# Patient Record
Sex: FEMALE | Race: BLACK OR AFRICAN AMERICAN | NOT HISPANIC OR LATINO | Employment: OTHER | URBAN - METROPOLITAN AREA
[De-identification: names, ages, dates, MRNs, and addresses within clinical notes are randomized per-mention and may not be internally consistent; named-entity substitution may affect disease eponyms.]

---

## 2023-12-06 ENCOUNTER — APPOINTMENT (OUTPATIENT)
Dept: MRI IMAGING | Facility: CLINIC | Age: 73
End: 2023-12-06
Attending: EMERGENCY MEDICINE
Payer: COMMERCIAL

## 2023-12-06 ENCOUNTER — HOSPITAL ENCOUNTER (INPATIENT)
Facility: CLINIC | Age: 73
LOS: 2 days | Discharge: HOME HEALTH | End: 2023-12-08
Attending: EMERGENCY MEDICINE | Admitting: STUDENT IN AN ORGANIZED HEALTH CARE EDUCATION/TRAINING PROGRAM
Payer: COMMERCIAL

## 2023-12-06 ENCOUNTER — APPOINTMENT (OUTPATIENT)
Dept: CT IMAGING | Facility: CLINIC | Age: 73
End: 2023-12-06
Attending: EMERGENCY MEDICINE
Payer: COMMERCIAL

## 2023-12-06 DIAGNOSIS — I63.9 ACUTE ISCHEMIC STROKE (H): ICD-10-CM

## 2023-12-06 LAB
ALBUMIN UR-MCNC: NEGATIVE MG/DL
ANION GAP SERPL CALCULATED.3IONS-SCNC: 14 MMOL/L (ref 7–15)
APPEARANCE UR: CLEAR
APTT PPP: 24 SECONDS (ref 22–38)
ATRIAL RATE - MUSE: 85 BPM
BASOPHILS # BLD AUTO: 0.1 10E3/UL (ref 0–0.2)
BASOPHILS NFR BLD AUTO: 1 %
BILIRUB UR QL STRIP: NEGATIVE
BUN SERPL-MCNC: 15 MG/DL (ref 8–23)
CALCIUM SERPL-MCNC: 9.3 MG/DL (ref 8.8–10.2)
CHLORIDE SERPL-SCNC: 97 MMOL/L (ref 98–107)
COLOR UR AUTO: ABNORMAL
CREAT SERPL-MCNC: 0.66 MG/DL (ref 0.51–0.95)
DEPRECATED HCO3 PLAS-SCNC: 24 MMOL/L (ref 22–29)
DIASTOLIC BLOOD PRESSURE - MUSE: NORMAL MMHG
EGFRCR SERPLBLD CKD-EPI 2021: >90 ML/MIN/1.73M2
EOSINOPHIL # BLD AUTO: 0 10E3/UL (ref 0–0.7)
EOSINOPHIL NFR BLD AUTO: 1 %
ERYTHROCYTE [DISTWIDTH] IN BLOOD BY AUTOMATED COUNT: 12 % (ref 10–15)
GLUCOSE BLDC GLUCOMTR-MCNC: 373 MG/DL (ref 70–99)
GLUCOSE SERPL-MCNC: 192 MG/DL (ref 70–99)
GLUCOSE UR STRIP-MCNC: 300 MG/DL
HBA1C MFR BLD: 7.7 %
HCT VFR BLD AUTO: 42.1 % (ref 35–47)
HGB BLD-MCNC: 14 G/DL (ref 11.7–15.7)
HGB UR QL STRIP: NEGATIVE
HOLD SPECIMEN: NORMAL
IMM GRANULOCYTES # BLD: 0 10E3/UL
IMM GRANULOCYTES NFR BLD: 0 %
INR PPP: 1.09 (ref 0.85–1.15)
INTERPRETATION ECG - MUSE: NORMAL
KETONES UR STRIP-MCNC: NEGATIVE MG/DL
LEUKOCYTE ESTERASE UR QL STRIP: NEGATIVE
LYMPHOCYTES # BLD AUTO: 2.5 10E3/UL (ref 0.8–5.3)
LYMPHOCYTES NFR BLD AUTO: 31 %
MCH RBC QN AUTO: 29.7 PG (ref 26.5–33)
MCHC RBC AUTO-ENTMCNC: 33.3 G/DL (ref 31.5–36.5)
MCV RBC AUTO: 89 FL (ref 78–100)
MONOCYTES # BLD AUTO: 0.6 10E3/UL (ref 0–1.3)
MONOCYTES NFR BLD AUTO: 8 %
NEUTROPHILS # BLD AUTO: 4.8 10E3/UL (ref 1.6–8.3)
NEUTROPHILS NFR BLD AUTO: 59 %
NITRATE UR QL: NEGATIVE
NRBC # BLD AUTO: 0 10E3/UL
NRBC BLD AUTO-RTO: 0 /100
P AXIS - MUSE: 62 DEGREES
PH UR STRIP: 7 [PH] (ref 5–7)
PLATELET # BLD AUTO: 273 10E3/UL (ref 150–450)
POTASSIUM SERPL-SCNC: 4.3 MMOL/L (ref 3.4–5.3)
PR INTERVAL - MUSE: 174 MS
QRS DURATION - MUSE: 76 MS
QT - MUSE: 356 MS
QTC - MUSE: 423 MS
R AXIS - MUSE: 13 DEGREES
RBC # BLD AUTO: 4.72 10E6/UL (ref 3.8–5.2)
RBC URINE: <1 /HPF
SODIUM SERPL-SCNC: 135 MMOL/L (ref 135–145)
SP GR UR STRIP: 1 (ref 1–1.03)
SQUAMOUS EPITHELIAL: <1 /HPF
SYSTOLIC BLOOD PRESSURE - MUSE: NORMAL MMHG
T AXIS - MUSE: 43 DEGREES
TROPONIN T SERPL HS-MCNC: 8 NG/L
UROBILINOGEN UR STRIP-MCNC: NORMAL MG/DL
VENTRICULAR RATE- MUSE: 85 BPM
WBC # BLD AUTO: 8 10E3/UL (ref 4–11)
WBC URINE: 1 /HPF

## 2023-12-06 PROCEDURE — 99291 CRITICAL CARE FIRST HOUR: CPT | Performed by: NURSE PRACTITIONER

## 2023-12-06 PROCEDURE — 255N000002 HC RX 255 OP 636: Performed by: EMERGENCY MEDICINE

## 2023-12-06 PROCEDURE — 250N000009 HC RX 250: Performed by: EMERGENCY MEDICINE

## 2023-12-06 PROCEDURE — 250N000011 HC RX IP 250 OP 636: Performed by: EMERGENCY MEDICINE

## 2023-12-06 PROCEDURE — 84484 ASSAY OF TROPONIN QUANT: CPT | Performed by: EMERGENCY MEDICINE

## 2023-12-06 PROCEDURE — 36415 COLL VENOUS BLD VENIPUNCTURE: CPT | Performed by: EMERGENCY MEDICINE

## 2023-12-06 PROCEDURE — 83036 HEMOGLOBIN GLYCOSYLATED A1C: CPT | Performed by: STUDENT IN AN ORGANIZED HEALTH CARE EDUCATION/TRAINING PROGRAM

## 2023-12-06 PROCEDURE — 81001 URINALYSIS AUTO W/SCOPE: CPT | Performed by: EMERGENCY MEDICINE

## 2023-12-06 PROCEDURE — 99291 CRITICAL CARE FIRST HOUR: CPT | Mod: 25

## 2023-12-06 PROCEDURE — 70496 CT ANGIOGRAPHY HEAD: CPT

## 2023-12-06 PROCEDURE — 70450 CT HEAD/BRAIN W/O DYE: CPT

## 2023-12-06 PROCEDURE — 80061 LIPID PANEL: CPT | Performed by: STUDENT IN AN ORGANIZED HEALTH CARE EDUCATION/TRAINING PROGRAM

## 2023-12-06 PROCEDURE — A9585 GADOBUTROL INJECTION: HCPCS | Performed by: EMERGENCY MEDICINE

## 2023-12-06 PROCEDURE — 85730 THROMBOPLASTIN TIME PARTIAL: CPT | Performed by: EMERGENCY MEDICINE

## 2023-12-06 PROCEDURE — 250N000013 HC RX MED GY IP 250 OP 250 PS 637: Performed by: EMERGENCY MEDICINE

## 2023-12-06 PROCEDURE — 0042T CT HEAD PERFUSION W CONTRAST: CPT

## 2023-12-06 PROCEDURE — 120N000001 HC R&B MED SURG/OB

## 2023-12-06 PROCEDURE — 70498 CT ANGIOGRAPHY NECK: CPT

## 2023-12-06 PROCEDURE — 85610 PROTHROMBIN TIME: CPT | Performed by: EMERGENCY MEDICINE

## 2023-12-06 PROCEDURE — 93005 ELECTROCARDIOGRAM TRACING: CPT

## 2023-12-06 PROCEDURE — 70553 MRI BRAIN STEM W/O & W/DYE: CPT

## 2023-12-06 PROCEDURE — 80048 BASIC METABOLIC PNL TOTAL CA: CPT | Performed by: EMERGENCY MEDICINE

## 2023-12-06 PROCEDURE — 85025 COMPLETE CBC W/AUTO DIFF WBC: CPT | Performed by: EMERGENCY MEDICINE

## 2023-12-06 PROCEDURE — 99223 1ST HOSP IP/OBS HIGH 75: CPT | Performed by: STUDENT IN AN ORGANIZED HEALTH CARE EDUCATION/TRAINING PROGRAM

## 2023-12-06 RX ORDER — DEXTROSE MONOHYDRATE 25 G/50ML
25-50 INJECTION, SOLUTION INTRAVENOUS
Status: DISCONTINUED | OUTPATIENT
Start: 2023-12-06 | End: 2023-12-08 | Stop reason: HOSPADM

## 2023-12-06 RX ORDER — NIFEDIPINE 30 MG
30 TABLET, EXTENDED RELEASE ORAL DAILY
COMMUNITY

## 2023-12-06 RX ORDER — ASPIRIN 81 MG/1
81 TABLET ORAL DAILY
Status: DISCONTINUED | OUTPATIENT
Start: 2023-12-07 | End: 2023-12-08 | Stop reason: HOSPADM

## 2023-12-06 RX ORDER — CLOPIDOGREL 300 MG/1
300 TABLET, FILM COATED ORAL ONCE
Status: COMPLETED | OUTPATIENT
Start: 2023-12-06 | End: 2023-12-06

## 2023-12-06 RX ORDER — IOPAMIDOL 755 MG/ML
117 INJECTION, SOLUTION INTRAVASCULAR ONCE
Status: COMPLETED | OUTPATIENT
Start: 2023-12-06 | End: 2023-12-06

## 2023-12-06 RX ORDER — METHYLPREDNISOLONE 4 MG
4 TABLET, DOSE PACK ORAL SEE ADMIN INSTRUCTIONS
Status: ON HOLD | COMMUNITY
Start: 2023-11-29 | End: 2023-12-08

## 2023-12-06 RX ORDER — GADOBUTROL 604.72 MG/ML
7 INJECTION INTRAVENOUS ONCE
Status: COMPLETED | OUTPATIENT
Start: 2023-12-06 | End: 2023-12-06

## 2023-12-06 RX ORDER — OMEPRAZOLE 40 MG/1
40 CAPSULE, DELAYED RELEASE ORAL DAILY
COMMUNITY

## 2023-12-06 RX ORDER — ASPIRIN 325 MG
325 TABLET ORAL ONCE
Status: COMPLETED | OUTPATIENT
Start: 2023-12-06 | End: 2023-12-06

## 2023-12-06 RX ORDER — ASPIRIN 81 MG/1
81 TABLET, CHEWABLE ORAL DAILY
Status: DISCONTINUED | OUTPATIENT
Start: 2023-12-07 | End: 2023-12-08 | Stop reason: HOSPADM

## 2023-12-06 RX ORDER — IRBESARTAN AND HYDROCHLOROTHIAZIDE 150; 12.5 MG/1; MG/1
1 TABLET, FILM COATED ORAL DAILY
COMMUNITY
End: 2023-12-06

## 2023-12-06 RX ORDER — LIDOCAINE 40 MG/G
CREAM TOPICAL
Status: DISCONTINUED | OUTPATIENT
Start: 2023-12-06 | End: 2023-12-08 | Stop reason: HOSPADM

## 2023-12-06 RX ORDER — INSULIN LISPRO 100 [IU]/ML
15 INJECTION, SOLUTION INTRAVENOUS; SUBCUTANEOUS
COMMUNITY

## 2023-12-06 RX ORDER — PANTOPRAZOLE SODIUM 40 MG/1
40 TABLET, DELAYED RELEASE ORAL
Status: DISCONTINUED | OUTPATIENT
Start: 2023-12-07 | End: 2023-12-08 | Stop reason: HOSPADM

## 2023-12-06 RX ORDER — NICOTINE POLACRILEX 4 MG
15-30 LOZENGE BUCCAL
Status: DISCONTINUED | OUTPATIENT
Start: 2023-12-06 | End: 2023-12-08 | Stop reason: HOSPADM

## 2023-12-06 RX ORDER — NIFEDIPINE 30 MG/1
30 TABLET, EXTENDED RELEASE ORAL DAILY
Status: DISCONTINUED | OUTPATIENT
Start: 2023-12-07 | End: 2023-12-08

## 2023-12-06 RX ORDER — CLOPIDOGREL BISULFATE 75 MG/1
75 TABLET ORAL DAILY
Status: DISCONTINUED | OUTPATIENT
Start: 2023-12-07 | End: 2023-12-08 | Stop reason: HOSPADM

## 2023-12-06 RX ORDER — ACETAMINOPHEN 325 MG/1
650 TABLET ORAL EVERY 4 HOURS PRN
Status: DISCONTINUED | OUTPATIENT
Start: 2023-12-06 | End: 2023-12-08 | Stop reason: HOSPADM

## 2023-12-06 RX ADMIN — GADOBUTROL 7 ML: 604.72 INJECTION INTRAVENOUS at 16:25

## 2023-12-06 RX ADMIN — CLOPIDOGREL BISULFATE 300 MG: 300 TABLET, FILM COATED ORAL at 17:31

## 2023-12-06 RX ADMIN — ASPIRIN 325 MG ORAL TABLET 325 MG: 325 PILL ORAL at 17:31

## 2023-12-06 RX ADMIN — SODIUM CHLORIDE 100 ML: 9 INJECTION, SOLUTION INTRAVENOUS at 13:12

## 2023-12-06 RX ADMIN — IOPAMIDOL 117 ML: 755 INJECTION, SOLUTION INTRAVENOUS at 13:11

## 2023-12-06 ASSESSMENT — ACTIVITIES OF DAILY LIVING (ADL)
ADLS_ACUITY_SCORE: 35

## 2023-12-06 NOTE — PROGRESS NOTES
"BRIEF VASCULAR NEUROLOGY NOTE    Please see formal Stroke consult note by LINDA Gerard, for more details.    MRI with e/o small acute R corpus callosal stroke.    Recommended to start DAPT, allow for permissive hypertension SBP< 220 and admit for stroke work up - Please use \"Acute ischemic stroke - no thrombolytics order set\".      Nick Mooney MD  Vascular Neurology Fellow  "

## 2023-12-06 NOTE — CONSULTS
"[facility]    Stroke Consult Note    Reason for Consult: Stroke Code     Chief Complaint: Headache and Slurred Speech      HPI  Angelika Quiroz is a 73 year old female with unclear past medical history.  She was brought to the ED for evaluation of altered speech pattern.  History is obtained from her daughter-in-law who reports that 3 days ago the patient's daughter passed away.  Since then, she has not been eating or sleeping well.  Yesterday, it was first noticed that her speech pattern seemed abnormal.  Today on waking, her speech was described as \"stuttering\" and she was talking quieter than usual.  She was also noted to have an abnormal gait, but was not clearly weak in any extremity.    She is not a candidate for TNK due to motor deficits and unclear last known well.  On examination, she had no focal weakness.  Speech exam is limited by language barrier, however her daughter-in-law reports that speech continues to be less fluent than usual.  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    Imaging Findings  CT head negative for acute pathology.  Extensive small vessel disease.  CTA head/neck without significant stenosis or LVO  Small acute infarct in the right corpus callosum    Intravenous Thrombolysis  Not given due to:   - minor/isolated/quickly resolving symptoms  - unclear or unfavorable risk-benefit profile for extended window thrombolysis beyond the conventional 4.5 hour time window    Endovascular Treatment  Not initiated due to absence of proximal vessel occlusion    Impression   Acute ischemic stroke of right corpus callosum due to undetermined etiology       Recommendations  - Use orderset: \"Ischemic Stroke Routine Admission\" or \"Ischemic Stroke No Thrombolytics/No Thrombectomy ICU Admission\"  - Neurochecks and Vital Signs every 4 hours   - Permissive HTN; goal SBP < 220 mmHg  - Daily aspirin 81 mg for " "secondary stroke prevention  - Plavix (clopidogrel) 300 mg PO loading dose x 1  - Plavix (clopidogrel) 75 mg PO Daily  - Statin: High intensity  - TTE (with Bubble Study if age 60 yrs or less)  - Telemetry, EKG  - Bedside Glucose Monitoring  - A1c, Lipid Panel, Troponin x 3  - PT/OT/SLP  - Stroke Education  - Euthermia, Euglycemia    Patient Follow-up     - final recommendation pending work-up    Thank you for this consult. We will continue to follow.      Merlene Carlos, CNP  Vascular Neurology    To page me or covering stroke neurology team member, click here: [facility]  Choose \"On Call\" tab at top, then select \"NEUROLOGY/ALL SITES\" from middle drop-down box, press Enter, then look for \"stroke\" or \"telestroke\" for your site.  ______________________________________________________    Clinically Significant Risk Factors Present on Admission                                    Past Medical History   No past medical history on file.  Past Surgical History   No past surgical history on file.  Medications   Home Meds  Prior to Admission medications    Not on File       Scheduled Meds   aspirin  325 mg Oral Once    clopidogrel  300 mg Oral Once       Infusion Meds      PRN Meds      Allergies   No Known Allergies  Family History   No family history on file.  Social History        Review of Systems   The 10 point Review of Systems is negative other than noted in the HPI or here.        PHYSICAL EXAMINATION  Temp:  [98  F (36.7  C)] 98  F (36.7  C)  Pulse:  [80-97] 83  Resp:  [16-58] 22  BP: (152-201)/(101-119) 152/103  SpO2:  [94 %-97 %] 96 %     Neurologic  Mental Status:  alert, oriented x 3, follows commands, naming and repetition normal, per daughter who is interpreting she has decreased fluency  Cranial Nerves:  visual fields intact, PERRL, EOMI with normal smooth pursuit, facial sensation intact and symmetric, hearing not formally tested but intact to conversation, no dysarthria, shoulder shrug strong bilaterally, " tongue protrusion midline, right nasolabial fold flattening  Motor:  normal muscle tone and bulk, no abnormal movements, able to move all limbs spontaneously, no pronator drift  Reflexes:  toes down-going  Sensory:  light touch sensation intact and symmetric throughout upper and lower extremities, no extinction on double simultaneous stimulation   Coordination:  normal finger-to-nose and heel-to-shin bilaterally without dysmetria  Station/Gait:  deferred    Dysphagia Screen  Per Nursing    Stroke Scales    NIHSS  1a. Level of Consciousness 0-->Alert, keenly responsive   1b. LOC Questions 0-->Answers both questions correctly   1c. LOC Commands 0-->Performs both tasks correctly   2.   Best Gaze 0-->Normal   3.   Visual 0-->No visual loss   4.   Facial Palsy 1-->Minor paralysis (flattened nasolabial fold, asymmetry on smiling)   5a. Motor Arm, Left 0-->No drift, limb holds 90 (or 45) degrees for full 10 secs   5b. Motor Arm, Right 0-->No drift, limb holds 90 (or 45) degrees for full 10 secs   6a. Motor Leg, Left 0-->No drift, leg holds 30 degree position for full 5 secs   6b. Motor Leg, right 0-->No drift, leg holds 30 degree position for full 5 secs   7.   Limb Ataxia 0-->Absent   8.   Sensory 0-->Normal, no sensory loss   9.   Best Language 1-->Mild-to-moderate aphasia, some obvious loss of fluency or facility of comprehension, without significant limitation on ideas expressed or form of expression. Reduction of speech and/or comprehension, however, makes conversation. . . (see row details)   10. Dysarthria 0-->Normal   11. Extinction and Inattention  0-->No abnormality   Total 2 (12/06/23 1400)       Modified Noxon Score (Pre-morbid)  1-No significant disability despite symptoms    Imaging  I personally reviewed all imaging; relevant findings per HPI.     Lab Results Data   CBC  Recent Labs   Lab 12/06/23  1231   WBC 8.0   RBC 4.72   HGB 14.0   HCT 42.1        Basic Metabolic Panel    Recent Labs   Lab  "12/06/23  1339      POTASSIUM 4.3   CHLORIDE 97*   CO2 24   BUN 15.0   CR 0.66   *   FLACA 9.3     Liver Panel  No results for input(s): \"PROTTOTAL\", \"ALBUMIN\", \"BILITOTAL\", \"ALKPHOS\", \"AST\", \"ALT\", \"BILIDIRECT\" in the last 168 hours.  INR    Recent Labs   Lab Test 12/06/23  1339   INR 1.09      Lipid Profile  No lab results found.  A1C  No lab results found.  Troponin    Recent Labs   Lab 12/06/23  1339   CTROPT 8          Stroke Code Data Data   Stroke Code Data  (for stroke code without tele)  Stroke code activated 12/06/23  1306   First stroke provider response 12/06/23  1308   Last known normal 12/05/23      Time of discovery (or onset of symptoms) 12/05/23  1700   Head CT read by Stroke Neuro Provider 12/06/23  1314   Was stroke code de-escalated? Yes  12/06/23  1432       I personally examined and evaluated the patient today. At the time of my evaluation and management the patient was in critical condition today due to concern for stroke. I personally managed imaging review, examination. Key decisions made today included MRI. I spent a total of 60 minutes providing critical care services, evaluating the patient, directing care and reviewing laboratory values and radiologic reports.    "

## 2023-12-06 NOTE — ED PROVIDER NOTES
History     Chief Complaint:  Headache and Slurred Speech       The history is provided by a relative and the patient.      Angelika Quiroz is a 73 year old female with a history of stroke who presents with a headache, slurred speech, and concern for stroke. The patient's daughter-in-law reports that 3 days ago her daughter , and she complained of some sharp chest pain later that same day. 2 days ago the daughter-in-law notes some abnormal voice changes, and generally been sleeping badly, but Angelika was otherwise normal. Yesterday she had a headache, but was talking and ambulating normally. This morning, the patient woke up with whole body shaking, slurred and stuttering speech, and limping on her right leg. The daughter-in-law notes that Angelika still has residual facial droop from her prior stroke, but no residual slurred speech - that symptom is new.  Of note she is not on aspirin or Plavix    Independent Historian:    Daughter-in-law - provided history    Review of External Notes:  None to review      Medications:    The patient is not currently taking any prescribed medications.    Past Medical History:    Stroke     Physical Exam   Patient Vitals for the past 24 hrs:   BP Temp Temp src Pulse Resp SpO2 Weight   23 1700 (!) 152/103 -- -- 83 22 96 % --   23 1645 (!) 186/113 -- -- 82 22 96 % --   23 1530 (!) 178/111 -- -- 82 21 94 % --   23 1509 -- -- -- -- -- -- 72.6 kg (160 lb)   23 1500 (!) 182/105 -- -- 80 20 96 % --   23 1430 (!) 196/116 -- -- 85 22 -- --   23 1400 (!) 192/103 -- -- 90 24 97 % --   23 1345 (!) 186/104 -- -- 82 (!) 58 95 % --   23 1330 (!) 176/119 -- -- 88 16 97 % --   23 1315 (!) 199/101 -- -- -- -- -- --   23 1225 (!) 201/104 98  F (36.7  C) Oral 97 16 97 % --        Physical Exam    Physical Exam   Constitutional:  Patient is oriented to person, place, and time. They appear well-developed and well-nourished.  HENT:    Mouth/Throat:   Oropharynx is clear and moist.   Eyes:    Conjunctivae normal and EOM are normal. Pupils are equal, round, and reactive to light.   Neck:    Normal range of motion.   Cardiovascular: Normal rate, regular rhythm and normal heart sounds.  Exam reveals no gallop and no friction rub.  No murmur heard.  Pulmonary/Chest:  Effort normal and breath sounds normal. Patient has no wheezes. Patient has no rales.   Abdominal:   Soft. Bowel sounds are normal. Patient exhibits no mass. There is no tenderness. There is no rebound and no guarding.   Musculoskeletal:  Normal range of motion. Patient exhibits no edema.   Neurological:   Patient is alert and oriented to person . Patient has normal strength.  Mild flattening of the right nasolabial fold.  Or sensory deficit. GCS 15  Skin:   Skin is warm and dry. No rash noted. No erythema.   Psychiatric:   Patient has a normal mood and affect. Patient's behavior is normal. Judgment and thought content normal.       Emergency Department Course   ECG  ECG taken at 1329, ECG read at 1332  Normal sinus rhythm  Normal ECG   Rate 85 bpm. NH interval 174 ms. QRS duration 76 ms. QT/QTc 356/423 ms. P-R-T axes 62 13 43.    Imaging:  MR Brain w/o & w Contrast   Final Result   IMPRESSION:     1. Small acute infarct in the right corpus callosum and adjacent   supraventricular white matter.   2. There is generalized atrophy of the brain. Extensive white matter   changes are present in the cerebral hemispheres that are consistent   with small vessel ischemic disease in this age patient.      Findings were discussed with Dr. Monroy via telephone at 4:36 PM on   12/6/2023.       CECIL FRYE MD            SYSTEM ID:  [id number]      CT Head Perfusion w Contrast - For Tier 2 Stroke   Final Result   IMPRESSION:    1. No findings to suggest an acute infarct.   2. Very minimal qualitative asymmetry on time to drain, mean transit   time, and cerebral blood flow maps involving the right  anterior   frontal lobe and left lateral frontal lobe. This did not meet criteria   for RAPID analysis. This may reflect low level hypoperfusion in these   areas.      Findings were discussed with Dr. Monroy telephone at 1:46 PM on   12/6/2023.       CECIL FRYE MD            SYSTEM ID:  [id number]      CTA Head Neck with Contrast   Final Result   IMPRESSION:    1. Patent arteries in the head and neck without vascular cutoff. No   evidence of dissection. No aneurysm identified. No significant   stenosis.      Findings were discussed with Dr. Monroy telephone at 1:46 PM on   12/6/2023.       CECIL FRYE MD            SYSTEM ID:  [id number]      CT Head w/o Contrast   Final Result   IMPRESSION:   No evidence of acute territorial infarct. Extensive   sequela of chronic microvascular ischemia somewhat limits evaluation   for small infarcts. Consider MRI if there is further clinical concern.      Findings were discussed with Dr. Monroy telephone at 1:46 PM on   12/6/2023.       CECIL FRYE MD            SYSTEM ID:  [id number]        Report per radiology    Laboratory:  Labs Ordered and Resulted from Time of ED Arrival to Time of ED Departure   BASIC METABOLIC PANEL - Abnormal       Result Value    Sodium 135      Potassium 4.3      Chloride 97 (*)     Carbon Dioxide (CO2) 24      Anion Gap 14      Urea Nitrogen 15.0      Creatinine 0.66      GFR Estimate >90      Calcium 9.3      Glucose 192 (*)    ROUTINE UA WITH MICROSCOPIC REFLEX TO CULTURE - Abnormal    Color Urine Straw      Appearance Urine Clear      Glucose Urine 300 (*)     Bilirubin Urine Negative      Ketones Urine Negative      Specific Gravity Urine 1.005      Blood Urine Negative      pH Urine 7.0      Protein Albumin Urine Negative      Urobilinogen Urine Normal      Nitrite Urine Negative      Leukocyte Esterase Urine Negative      RBC Urine <1      WBC Urine 1      Squamous Epithelials Urine <1     INR - Normal    INR 1.09     PARTIAL  THROMBOPLASTIN TIME - Normal    aPTT 24     TROPONIN T, HIGH SENSITIVITY - Normal    Troponin T, High Sensitivity 8     CBC WITH PLATELETS AND DIFFERENTIAL    WBC Count 8.0      RBC Count 4.72      Hemoglobin 14.0      Hematocrit 42.1      MCV 89      MCH 29.7      MCHC 33.3      RDW 12.0      Platelet Count 273      % Neutrophils 59      % Lymphocytes 31      % Monocytes 8      % Eosinophils 1      % Basophils 1      % Immature Granulocytes 0      NRBCs per 100 WBC 0      Absolute Neutrophils 4.8      Absolute Lymphocytes 2.5      Absolute Monocytes 0.6      Absolute Eosinophils 0.0      Absolute Basophils 0.1      Absolute Immature Granulocytes 0.0      Absolute NRBCs 0.0     GLUCOSE MONITOR NURSING POCT        Procedures   None    Emergency Department Course & Assessments:    Interventions:  Medications   iopamidol (ISOVUE-370) solution 117 mL (117 mLs Intravenous $Given 12/6/23 1311)   sodium chloride 0.9 % bag 500mL for CT scan flush use (100 mLs Intravenous $Given 12/6/23 1312)   gadobutrol (GADAVIST) injection 7 mL (7 mLs Intravenous $Given 12/6/23 1625)   aspirin (ASA) tablet 325 mg (325 mg Oral $Given 12/6/23 1731)   clopidogrel (PLAVIX) tablet 300 mg (300 mg Oral $Given 12/6/23 1731)        Assessments:  1245 I obtained history and examined the patient as noted above.  1248 I declared a Tier 2 code stroke.  1540   updated patient and family awaiting MRI  1635 updated the patient's family MRI does show a small stroke on the right side      Independent Interpretation (X-rays, CTs, rhythm strip):  None    Consultations/Discussion of Management or Tests:  1311 I spoke with Merlene Carlos, nurse practitioner with stroke neurology, regarding the patient's history and presentation in the emergency department today.  1329 I spoke with Merlene Carlos, nurse practitioner with stroke neurology, regarding the patient's history and presentation in the emergency department today.  1345 I spoke with Dr. Thapa,  radiologist, regarding the patient's history and presentation in the emergency department today.  1639 discussed the MRI findings with Dr. Thapa radiologist.  There is a small infarct in the right corpus callosum and white matter this is just adjacent to the chronic stroke.  Discussed again with stroke neurology.  Recommended dual antiplatelet therapy.  Discussed with Dr. Ventura the hospitalist for admission.    Social Determinants of Health affecting care:  None     Disposition:  The patient was admitted to the hospital under the care of Dr. Ventura.     Impression & Plan    CMS Diagnoses: The patient has stroke symptoms:         ED Stroke specific documentation           NIHSS PDF     Patient last known well time: last night  ED Provider first to bedside at: 1245  CT Results received at: 1345    Thrombolytics:   Not given due to:   - minor/isolated/quickly resolving symptoms    If treating with thrombolytics: Ensure SBP<180 and DBP<105 prior to treatment with thrombolytics.  Administering thrombolytics after treatment with IV labetalol, hydralazine, or nicardipine is reasonable once BP control is established.    Endovascular Retrieval:  Not initiated due to absence of proximal vessel occlusion    National [employer] Stroke Scale (Baseline)  Time Performed: 1245     Score    Level of consciousness: (0)   Alert, keenly responsive    LOC questions: (0)   Answers both questions correctly    LOC commands: (0)   Performs both tasks correctly    Best gaze: (0)   Normal    Visual: (0)   No visual loss    Facial palsy: (1)   Minor paralysis (flat nasolabial fold, smile asymmetry) pre-existing    Motor arm (left): (0)   No drift    Motor arm (right): (0)   No drift    Motor leg (left): (0)   No drift    Motor leg (right): (0)   No drift    Limb ataxia: (0)   Absent    Sensory: (0)   Normal- no sensory loss    Best language: (0)   Normal- no aphasia    Dysarthria: (0)   Normal    Extinction and inattention: (0)    No abnormality        Total Score:  1      Stroke Mimics were considered (including migraine headache, seizure disorder, hypoglycemia (or hyperglycemia), head or spinal trauma, CNS infection, Toxin ingestion and shock state (e.g. sepsis) .    Medical Decision Making:  Angelika Quiroz is a 73-year-old female presenting to the emergency department for some stuttering speech some gait instability but not sure which side.  On my examination she did have a residual right nasolabial fold from previous stroke.  Her speech was better but the daughter said it was stuttering this morning.  A tier 2 code stroke was called.  CT CT was performed that does not show any acute stroke but there was evidence of old stroke.  MRI was then performed which does show a small acute stroke in the right corpus callosum very near to the other old stroke.  Her neurologic exam has been very stable here no acute changes.  She is not a tPA candidate given the mild nature of her symptoms and her last normal.  Additionally there is no LVO  .  He was recommended to put her on dual antiplatelet therapy.  I will admit her to the hospital.      Critical Care time:  was 45 minutes for this patient excluding procedures.    Diagnosis:    ICD-10-CM    1. Acute ischemic stroke (H)  I63.9            Discharge Medications:  New Prescriptions    No medications on file     Scribe Disclosure:  Dom BUSTAMANTE, am serving as a scribe at 1:07 PM on 12/6/2023 to document services personally performed by Susan Monroy MD, based on my observations and the provider's statements to me.  12/6/2023   Susan Monroy MD Bochert, Michelle Ann, MD  12/06/23 1846

## 2023-12-06 NOTE — ED NOTES
[facility]  ED Nurse Handoff Report    ED Chief complaint: Headache and Slurred Speech      ED Diagnosis:   Final diagnoses:   None       Code Status: NOT documented    Allergies: No Known Allergies    Patient Story: Code stroke tier 2, prior stroke, CT>MRI, positive stroke with increased right side weakness.   Focused Assessment:    Labs Ordered and Resulted from Time of ED Arrival to Time of ED Departure   BASIC METABOLIC PANEL - Abnormal       Result Value    Sodium 135      Potassium 4.3      Chloride 97 (*)     Carbon Dioxide (CO2) 24      Anion Gap 14      Urea Nitrogen 15.0      Creatinine 0.66      GFR Estimate >90      Calcium 9.3      Glucose 192 (*)    ROUTINE UA WITH MICROSCOPIC REFLEX TO CULTURE - Abnormal    Color Urine Straw      Appearance Urine Clear      Glucose Urine 300 (*)     Bilirubin Urine Negative      Ketones Urine Negative      Specific Gravity Urine 1.005      Blood Urine Negative      pH Urine 7.0      Protein Albumin Urine Negative      Urobilinogen Urine Normal      Nitrite Urine Negative      Leukocyte Esterase Urine Negative      RBC Urine <1      WBC Urine 1      Squamous Epithelials Urine <1     INR - Normal    INR 1.09     PARTIAL THROMBOPLASTIN TIME - Normal    aPTT 24     TROPONIN T, HIGH SENSITIVITY - Normal    Troponin T, High Sensitivity 8     CBC WITH PLATELETS AND DIFFERENTIAL    WBC Count 8.0      RBC Count 4.72      Hemoglobin 14.0      Hematocrit 42.1      MCV 89      MCH 29.7      MCHC 33.3      RDW 12.0      Platelet Count 273      % Neutrophils 59      % Lymphocytes 31      % Monocytes 8      % Eosinophils 1      % Basophils 1      % Immature Granulocytes 0      NRBCs per 100 WBC 0      Absolute Neutrophils 4.8      Absolute Lymphocytes 2.5      Absolute Monocytes 0.6      Absolute Eosinophils 0.0      Absolute Basophils 0.1      Absolute Immature Granulocytes 0.0      Absolute NRBCs 0.0     GLUCOSE MONITOR NURSING POCT     MR Brain w/o & w Contrast    Final Result   IMPRESSION:     1. Small acute infarct in the right corpus callosum and adjacent   supraventricular white matter.   2. There is generalized atrophy of the brain. Extensive white matter   changes are present in the cerebral hemispheres that are consistent   with small vessel ischemic disease in this age patient.      Findings were discussed with Dr. Monroy via telephone at 4:36 PM on   12/6/2023.       CECIL FRYE MD            SYSTEM ID:  [id number]      CT Head Perfusion w Contrast - For Tier 2 Stroke   Final Result   IMPRESSION:    1. No findings to suggest an acute infarct.   2. Very minimal qualitative asymmetry on time to drain, mean transit   time, and cerebral blood flow maps involving the right anterior   frontal lobe and left lateral frontal lobe. This did not meet criteria   for RAPID analysis. This may reflect low level hypoperfusion in these   areas.      Findings were discussed with Dr. Monroy telephone at 1:46 PM on   12/6/2023.       CECIL FRYE MD            SYSTEM ID:  [id number]      CTA Head Neck with Contrast   Final Result   IMPRESSION:    1. Patent arteries in the head and neck without vascular cutoff. No   evidence of dissection. No aneurysm identified. No significant   stenosis.      Findings were discussed with Dr. Monroy telephone at 1:46 PM on   12/6/2023.       CECIL FRYE MD            SYSTEM ID:  [id number]      CT Head w/o Contrast   Final Result   IMPRESSION:   No evidence of acute territorial infarct. Extensive   sequela of chronic microvascular ischemia somewhat limits evaluation   for small infarcts. Consider MRI if there is further clinical concern.      Findings were discussed with Dr. Monroy telephone at 1:46 PM on   12/6/2023.       CECIL FRYE MD            SYSTEM ID:  [id number]            Treatments and/or interventions provided: see above  Patient's response to treatments and/or interventions: fair    To be done/followed up on inpatient  unit:  NA    Does this patient have any cognitive concerns?:  NONE    Activity level - Baseline/Home:  Stand with Assist  Activity Level - Current:   Stand with assist x2    Patient's Preferred language: Twi   Needed?: No    Isolation: None  Infection: Not Applicable  Patient tested for COVID 19 prior to admission: NO  Bariatric?: No    Vital Signs:   Vitals:    12/06/23 1500 12/06/23 1509 12/06/23 1530 12/06/23 1645   BP: (!) 182/105  (!) 178/111 (!) 186/113   Pulse: 80  82 82   Resp: 20  21 22   Temp:       TempSrc:       SpO2: 96%  94% 96%   Weight:  72.6 kg (160 lb)         Cardiac Rhythm:     Was the PSS-3 completed:   Yes  What interventions are required if any?               Family Comments: family at the bedside    For the majority of the shift this patient's behavior was Green.   Behavioral interventions performed were NA.    ED NURSE PHONE NUMBER: ER

## 2023-12-07 ENCOUNTER — APPOINTMENT (OUTPATIENT)
Dept: PHYSICAL THERAPY | Facility: CLINIC | Age: 73
End: 2023-12-07
Attending: STUDENT IN AN ORGANIZED HEALTH CARE EDUCATION/TRAINING PROGRAM
Payer: COMMERCIAL

## 2023-12-07 ENCOUNTER — APPOINTMENT (OUTPATIENT)
Dept: CT IMAGING | Facility: CLINIC | Age: 73
End: 2023-12-07
Payer: COMMERCIAL

## 2023-12-07 ENCOUNTER — APPOINTMENT (OUTPATIENT)
Dept: OCCUPATIONAL THERAPY | Facility: CLINIC | Age: 73
End: 2023-12-07
Attending: STUDENT IN AN ORGANIZED HEALTH CARE EDUCATION/TRAINING PROGRAM
Payer: COMMERCIAL

## 2023-12-07 ENCOUNTER — APPOINTMENT (OUTPATIENT)
Dept: ULTRASOUND IMAGING | Facility: CLINIC | Age: 73
End: 2023-12-07
Attending: HOSPITALIST
Payer: COMMERCIAL

## 2023-12-07 ENCOUNTER — APPOINTMENT (OUTPATIENT)
Dept: CARDIOLOGY | Facility: CLINIC | Age: 73
End: 2023-12-07
Attending: NURSE PRACTITIONER
Payer: COMMERCIAL

## 2023-12-07 ENCOUNTER — APPOINTMENT (OUTPATIENT)
Dept: GENERAL RADIOLOGY | Facility: CLINIC | Age: 73
End: 2023-12-07
Payer: COMMERCIAL

## 2023-12-07 ENCOUNTER — APPOINTMENT (OUTPATIENT)
Dept: SPEECH THERAPY | Facility: CLINIC | Age: 73
End: 2023-12-07
Attending: STUDENT IN AN ORGANIZED HEALTH CARE EDUCATION/TRAINING PROGRAM
Payer: COMMERCIAL

## 2023-12-07 LAB
ANION GAP SERPL CALCULATED.3IONS-SCNC: 13 MMOL/L (ref 7–15)
BUN SERPL-MCNC: 16.6 MG/DL (ref 8–23)
CALCIUM SERPL-MCNC: 9.1 MG/DL (ref 8.8–10.2)
CHLORIDE SERPL-SCNC: 99 MMOL/L (ref 98–107)
CHOLEST SERPL-MCNC: 210 MG/DL
CREAT SERPL-MCNC: 0.71 MG/DL (ref 0.51–0.95)
D DIMER PPP FEU-MCNC: 8.7 UG/ML FEU (ref 0–0.5)
DEPRECATED HCO3 PLAS-SCNC: 23 MMOL/L (ref 22–29)
EGFRCR SERPLBLD CKD-EPI 2021: 90 ML/MIN/1.73M2
ERYTHROCYTE [DISTWIDTH] IN BLOOD BY AUTOMATED COUNT: 12.1 % (ref 10–15)
GLUCOSE BLDC GLUCOMTR-MCNC: 158 MG/DL (ref 70–99)
GLUCOSE BLDC GLUCOMTR-MCNC: 205 MG/DL (ref 70–99)
GLUCOSE BLDC GLUCOMTR-MCNC: 305 MG/DL (ref 70–99)
GLUCOSE BLDC GLUCOMTR-MCNC: 347 MG/DL (ref 70–99)
GLUCOSE BLDC GLUCOMTR-MCNC: 360 MG/DL (ref 70–99)
GLUCOSE BLDC GLUCOMTR-MCNC: 437 MG/DL (ref 70–99)
GLUCOSE SERPL-MCNC: 483 MG/DL (ref 70–99)
HCT VFR BLD AUTO: 41.2 % (ref 35–47)
HDLC SERPL-MCNC: 69 MG/DL
HGB BLD-MCNC: 13.9 G/DL (ref 11.7–15.7)
LDLC SERPL CALC-MCNC: 127 MG/DL
LVEF ECHO: NORMAL
MCH RBC QN AUTO: 29.9 PG (ref 26.5–33)
MCHC RBC AUTO-ENTMCNC: 33.7 G/DL (ref 31.5–36.5)
MCV RBC AUTO: 89 FL (ref 78–100)
NONHDLC SERPL-MCNC: 141 MG/DL
PLATELET # BLD AUTO: 267 10E3/UL (ref 150–450)
POTASSIUM SERPL-SCNC: 3.7 MMOL/L (ref 3.4–5.3)
RBC # BLD AUTO: 4.65 10E6/UL (ref 3.8–5.2)
SARS-COV-2 RNA RESP QL NAA+PROBE: NEGATIVE
SODIUM SERPL-SCNC: 135 MMOL/L (ref 135–145)
TRIGL SERPL-MCNC: 69 MG/DL
TROPONIN T SERPL HS-MCNC: 8 NG/L
WBC # BLD AUTO: 7 10E3/UL (ref 4–11)

## 2023-12-07 PROCEDURE — 99291 CRITICAL CARE FIRST HOUR: CPT

## 2023-12-07 PROCEDURE — 36415 COLL VENOUS BLD VENIPUNCTURE: CPT | Performed by: STUDENT IN AN ORGANIZED HEALTH CARE EDUCATION/TRAINING PROGRAM

## 2023-12-07 PROCEDURE — 97530 THERAPEUTIC ACTIVITIES: CPT | Mod: GP | Performed by: PHYSICAL THERAPIST

## 2023-12-07 PROCEDURE — 85379 FIBRIN DEGRADATION QUANT: CPT

## 2023-12-07 PROCEDURE — 250N000013 HC RX MED GY IP 250 OP 250 PS 637: Performed by: STUDENT IN AN ORGANIZED HEALTH CARE EDUCATION/TRAINING PROGRAM

## 2023-12-07 PROCEDURE — 250N000012 HC RX MED GY IP 250 OP 636 PS 637: Performed by: STUDENT IN AN ORGANIZED HEALTH CARE EDUCATION/TRAINING PROGRAM

## 2023-12-07 PROCEDURE — 250N000013 HC RX MED GY IP 250 OP 250 PS 637

## 2023-12-07 PROCEDURE — 250N000011 HC RX IP 250 OP 636: Performed by: STUDENT IN AN ORGANIZED HEALTH CARE EDUCATION/TRAINING PROGRAM

## 2023-12-07 PROCEDURE — 80048 BASIC METABOLIC PNL TOTAL CA: CPT | Performed by: STUDENT IN AN ORGANIZED HEALTH CARE EDUCATION/TRAINING PROGRAM

## 2023-12-07 PROCEDURE — 84484 ASSAY OF TROPONIN QUANT: CPT

## 2023-12-07 PROCEDURE — 97162 PT EVAL MOD COMPLEX 30 MIN: CPT | Mod: GP | Performed by: PHYSICAL THERAPIST

## 2023-12-07 PROCEDURE — 93970 EXTREMITY STUDY: CPT

## 2023-12-07 PROCEDURE — 97165 OT EVAL LOW COMPLEX 30 MIN: CPT | Mod: GO

## 2023-12-07 PROCEDURE — 93308 TTE F-UP OR LMTD: CPT | Mod: 26 | Performed by: INTERNAL MEDICINE

## 2023-12-07 PROCEDURE — 99207 PR NO BILLABLE SERVICE THIS VISIT: CPT | Performed by: NURSE PRACTITIONER

## 2023-12-07 PROCEDURE — 255N000002 HC RX 255 OP 636: Performed by: STUDENT IN AN ORGANIZED HEALTH CARE EDUCATION/TRAINING PROGRAM

## 2023-12-07 PROCEDURE — 99207 PR APP CREDIT; MD BILLING SHARED VISIT: CPT | Performed by: HOSPITALIST

## 2023-12-07 PROCEDURE — 93325 DOPPLER ECHO COLOR FLOW MAPG: CPT | Mod: 26 | Performed by: INTERNAL MEDICINE

## 2023-12-07 PROCEDURE — 93321 DOPPLER ECHO F-UP/LMTD STD: CPT | Mod: 26 | Performed by: INTERNAL MEDICINE

## 2023-12-07 PROCEDURE — 36415 COLL VENOUS BLD VENIPUNCTURE: CPT

## 2023-12-07 PROCEDURE — 71045 X-RAY EXAM CHEST 1 VIEW: CPT

## 2023-12-07 PROCEDURE — 250N000009 HC RX 250: Performed by: STUDENT IN AN ORGANIZED HEALTH CARE EDUCATION/TRAINING PROGRAM

## 2023-12-07 PROCEDURE — 93005 ELECTROCARDIOGRAM TRACING: CPT

## 2023-12-07 PROCEDURE — 250N000013 HC RX MED GY IP 250 OP 250 PS 637: Performed by: INTERNAL MEDICINE

## 2023-12-07 PROCEDURE — 93321 DOPPLER ECHO F-UP/LMTD STD: CPT

## 2023-12-07 PROCEDURE — 250N000013 HC RX MED GY IP 250 OP 250 PS 637: Performed by: NURSE PRACTITIONER

## 2023-12-07 PROCEDURE — 93010 ELECTROCARDIOGRAM REPORT: CPT | Performed by: INTERNAL MEDICINE

## 2023-12-07 PROCEDURE — 85027 COMPLETE CBC AUTOMATED: CPT | Performed by: STUDENT IN AN ORGANIZED HEALTH CARE EDUCATION/TRAINING PROGRAM

## 2023-12-07 PROCEDURE — 92610 EVALUATE SWALLOWING FUNCTION: CPT | Mod: GN

## 2023-12-07 PROCEDURE — 71275 CT ANGIOGRAPHY CHEST: CPT

## 2023-12-07 PROCEDURE — 93325 DOPPLER ECHO COLOR FLOW MAPG: CPT

## 2023-12-07 PROCEDURE — 120N000001 HC R&B MED SURG/OB

## 2023-12-07 PROCEDURE — 87635 SARS-COV-2 COVID-19 AMP PRB: CPT

## 2023-12-07 RX ORDER — NITROGLYCERIN 0.4 MG/1
0.4 TABLET SUBLINGUAL EVERY 5 MIN PRN
Status: DISCONTINUED | OUTPATIENT
Start: 2023-12-07 | End: 2023-12-08 | Stop reason: HOSPADM

## 2023-12-07 RX ORDER — LIDOCAINE 4 G/G
1 PATCH TOPICAL
Status: DISCONTINUED | OUTPATIENT
Start: 2023-12-08 | End: 2023-12-08 | Stop reason: HOSPADM

## 2023-12-07 RX ORDER — MAGNESIUM HYDROXIDE/ALUMINUM HYDROXICE/SIMETHICONE 120; 1200; 1200 MG/30ML; MG/30ML; MG/30ML
15 SUSPENSION ORAL DAILY PRN
Status: DISCONTINUED | OUTPATIENT
Start: 2023-12-07 | End: 2023-12-08 | Stop reason: HOSPADM

## 2023-12-07 RX ORDER — LANOLIN ALCOHOL/MO/W.PET/CERES
3 CREAM (GRAM) TOPICAL
Status: DISCONTINUED | OUTPATIENT
Start: 2023-12-07 | End: 2023-12-08 | Stop reason: HOSPADM

## 2023-12-07 RX ORDER — IOPAMIDOL 755 MG/ML
63 INJECTION, SOLUTION INTRAVASCULAR ONCE
Status: COMPLETED | OUTPATIENT
Start: 2023-12-07 | End: 2023-12-07

## 2023-12-07 RX ORDER — ATORVASTATIN CALCIUM 80 MG/1
80 TABLET, FILM COATED ORAL EVERY EVENING
Status: DISCONTINUED | OUTPATIENT
Start: 2023-12-07 | End: 2023-12-08 | Stop reason: HOSPADM

## 2023-12-07 RX ADMIN — INSULIN ASPART 10 UNITS: 100 INJECTION, SOLUTION INTRAVENOUS; SUBCUTANEOUS at 12:11

## 2023-12-07 RX ADMIN — MELATONIN TAB 3 MG 3 MG: 3 TAB at 21:26

## 2023-12-07 RX ADMIN — PANTOPRAZOLE SODIUM 40 MG: 40 TABLET, DELAYED RELEASE ORAL at 09:49

## 2023-12-07 RX ADMIN — HUMAN ALBUMIN MICROSPHERES AND PERFLUTREN 9 ML: 10; .22 INJECTION, SOLUTION INTRAVENOUS at 11:02

## 2023-12-07 RX ADMIN — NITROGLYCERIN 0.4 MG: 0.4 TABLET SUBLINGUAL at 11:14

## 2023-12-07 RX ADMIN — INSULIN GLARGINE 20 UNITS: 100 INJECTION, SOLUTION SUBCUTANEOUS at 10:21

## 2023-12-07 RX ADMIN — ATORVASTATIN CALCIUM 80 MG: 80 TABLET, FILM COATED ORAL at 21:26

## 2023-12-07 RX ADMIN — SODIUM CHLORIDE 88 ML: 9 INJECTION, SOLUTION INTRAVENOUS at 14:09

## 2023-12-07 RX ADMIN — ACETAMINOPHEN 650 MG: 325 TABLET, FILM COATED ORAL at 21:26

## 2023-12-07 RX ADMIN — PANTOPRAZOLE SODIUM 40 MG: 40 TABLET, DELAYED RELEASE ORAL at 16:09

## 2023-12-07 RX ADMIN — INSULIN ASPART 10 UNITS: 100 INJECTION, SOLUTION INTRAVENOUS; SUBCUTANEOUS at 10:25

## 2023-12-07 RX ADMIN — CLOPIDOGREL BISULFATE 75 MG: 75 TABLET ORAL at 09:49

## 2023-12-07 RX ADMIN — IOPAMIDOL 63 ML: 755 INJECTION, SOLUTION INTRAVENOUS at 14:09

## 2023-12-07 RX ADMIN — ASPIRIN 81 MG: 81 TABLET, COATED ORAL at 09:49

## 2023-12-07 ASSESSMENT — ACTIVITIES OF DAILY LIVING (ADL)
ADLS_ACUITY_SCORE: 37
ADLS_ACUITY_SCORE: 35
ADLS_ACUITY_SCORE: 37

## 2023-12-07 NOTE — H&P
[facility]    History and Physical - Hospitalist Service       Date of Admission:  2023    Assessment & Plan      Angelika Quiroz is a 73 year old female with reported past medical history significant for stroke- though no hx is available in the EMR admitted on 2023 with headache and slurred speech. She is found to have a small ischemic stroke.     Acute ischemic infarct   Pt presents to the ED with headache and slurred and stuttering speech. She was also limping on her right leg. Her daughter reports she had a prior stroke and has some residual mild facial droop as a result. CT head and CTA of the head and neck were unremarkable. MRI of the brain did show a small acute infarct in the right corpus callosum and adjacent supraventricular white matter..   - Admit to inpatient   - Neurochecks and Vital Signs every 4 hours   - Permissive HTN; goal SBP < 220 mmHg  - Daily aspirin 81 mg for secondary stroke prevention  - Plavix (clopidogrel) 300 mg PO loading dose x 1  - Plavix (clopidogrel) 75 mg PO Daily  - Statin: High intensity  - TTE (with Bubble Study if age 60 yrs or less)  - Telemetry, EKG  - Bedside Glucose Monitoring  - A1c, Lipid Panel, Troponin x 3  - PT/OT/SLP  - Stroke Education  - Euthermia, Euglycemia    Type II DM  - Continue PTA lantus 20 units daily  - Continue PTA mealtime lispro but decrease to 10 units (from 15)  - MDSSI   - Check hemoglobin A1C   - Hold PTA metformin for now    HTN  - Hold PTA Nifedipine to allow for permissive hypertension.     Acute Grief Response  Unfortunately, the patient's daughter  this past weekend.     Mechanical fall   Right sided abdominal/flank pain   Left leg pain   Pt's son reports she had a fall several months ago and has had some pain in her R side and left leg since then. She has been evaluated through the [facility] system for this.     Diet: NPO for Medical/Clinical Reasons Except for: No Exceptions    DVT Prophylaxis: Pneumatic  Compression Devices  Landin Catheter: Not present  Lines: None     Cardiac Monitoring: None  Code Status: Full Code     Clinically Significant Risk Factors Present on Admission                  # Hypertension: Home medication list includes antihypertensive(s)                 Disposition Plan      Expected Discharge Date: 2023                  Erin Ventura MD  Hospitalist Service  [facility]  Securely message with [employer] (more info)  Text page via [employer] Paging/Directory     ______________________________________________________________________    Chief Complaint   Slurred speech     History is obtained from the patient' son who assists with interpretation.     History of Present Illness   Angelika Quiroz is a 73 year old female who presents to the ED with slurred speech. Unfortunately the patient's daughter  this past weekend. The patient has been grief stricken for the past two days, and not doing much. Today she Woke up and reported she couldn't see anything. Her daughter in law noticed today that her words were slurred. She did not have any upper or lower extremity weakness.   No CP, palpitations, shortness of breath.       Past Medical History    No past medical history on file.    Past Surgical History   No past surgical history on file.    Prior to Admission Medications   Prior to Admission Medications   Prescriptions Last Dose Informant Patient Reported? Taking?   NIFEdipine ER (ADALAT CC) 30 MG 24 hr tablet   Yes No   Sig: Take 30 mg by mouth daily   insulin glargine (LANTUS PEN) 100 UNIT/ML pen   Yes No   Sig: Inject Subcutaneous at bedtime   insulin lispro (HUMALOG VIAL) 100 UNIT/ML vial   Yes No   Sig: Inject Subcutaneous 3 times daily (before meals)   irbesartan-hydrochlorothiazide (AVALIDE) 150-12.5 MG tablet   Yes No   Sig: Take 1 tablet by mouth daily   metFORMIN (GLUCOPHAGE) 500 MG tablet   Yes No   Sig: Take 500 mg by mouth 2 times daily (with meals)    omeprazole (PRILOSEC) 40 MG DR capsule   Yes No   Sig: Take 40 mg by mouth daily      Facility-Administered Medications: None        Review of Systems    The 10 point Review of Systems is negative other than noted in the HPI or here.     Physical Exam   Vital Signs: Temp: 98  F (36.7  C) Temp src: Oral BP: (!) 152/103 Pulse: 83   Resp: 22 SpO2: 96 % O2 Device: None (Room air)    Weight: 160 lbs 0 oz    Constitutional: Awake, alert, cooperative, no apparent distress.  Eyes: Conjunctiva and pupils examined and normal.  HEENT: Moist mucous membranes, normal dentition.  Respiratory: Clear to auscultation bilaterally, no crackles or wheezing.  Cardiovascular: Regular rate and rhythm, normal S1 and S2, and no murmur noted.  GI: Soft, non-distended, non-tender, normal bowel sounds.  Skin: No rashes, no cyanosis, no edema.  Musculoskeletal: No joint swelling, erythema or tenderness.  Neurologic: Cranial nerves 2-12 intact, normal strength and sensation. Unable to assess speech as pt does not speak english.   Psychiatric: Alert, oriented to person, place and time, no obvious anxiety or depression.      Medical Decision Making       75 MINUTES SPENT BY ME on the date of service doing chart review, history, exam, documentation & further activities per the note.      Data     I have personally reviewed the following data over the past 24 hrs:    8.0  \   14.0   / 273     135 97 (L) 15.0 /  192 (H)   4.3 24 0.66 \     Trop: 8 BNP: N/A     INR:  1.09 PTT:  24   D-dimer:  N/A Fibrinogen:  N/A       Imaging results reviewed over the past 24 hrs:   Recent Results (from the past 24 hour(s))   CT Head w/o Contrast    Narrative    CT SCAN OF THE HEAD WITHOUT CONTRAST   12/6/2023 1:09 PM     HISTORY: Headache; Neurologic deficit, non-traumatic; speech  difficulty    TECHNIQUE:  Axial images of the head and coronal reformations without  IV contrast material. Radiation dose for this scan was reduced using  automated exposure control,  adjustment of the mA and/or kV according  to patient size, or iterative reconstruction technique.    COMPARISON: None.    FINDINGS: There is no evidence of intracranial hemorrhage, mass, acute  infarct or anomaly. The ventricles are normal in size, shape and  configuration. Mild diffuse parenchymal volume loss, within normal  limits for age. Extensive patchy periventricular white matter  hypodensities which are nonspecific, but likely related to chronic  microvascular ischemic disease. Small bilateral thalamic and corona  radiata lacunar infarcts.    The visualized portions of the sinuses and mastoids appear normal. The  bony calvarium and bones of the skull base appear intact.       Impression    IMPRESSION:   No evidence of acute territorial infarct. Extensive  sequela of chronic microvascular ischemia somewhat limits evaluation  for small infarcts. Consider MRI if there is further clinical concern.    Findings were discussed with Dr. Monroy telephone at 1:46 PM on  12/6/2023.     CECIL FRYE MD         SYSTEM ID:  [id number]   CTA Head Neck with Contrast    Narrative    CT ANGIOGRAM OF THE HEAD AND NECK WITH CONTRAST  12/6/2023 1:12 PM     HISTORY: Code Stroke to evaluate for potential thrombolysis and  thrombectomy. Speech difficulty.    TECHNIQUE:  CT angiography with an injection of 67mL Isovue-370 IV  with scans through the head and neck. Images were transferred to a  separate 3-D workstation where multiplanar reformations and 3-D images  were created. Estimates of carotid stenoses are made relative to the  distal internal carotid artery diameters except as noted. Radiation  dose for this scan was reduced using automated exposure control,  adjustment of the mA and/or kV according to patient size, or iterative  reconstruction technique.    COMPARISON: None.     CT HEAD FINDINGS: No contrast enhancing lesions. Cerebral blood flow  is grossly normal.     CT ANGIOGRAM HEAD FINDINGS:  Scattered bilateral  carotid siphon  atherosclerotic calcifications without luminal narrowing. The major  intracranial arteries including the proximal branches of the anterior  cerebral, middle cerebral, and posterior cerebral arteries appear  patent without vascular cutoff. No aneurysm identified. No significant  stenosis. Normal variant fetal origin of the left posterior cerebral  artery. Venous circulation is unremarkable.     CT ANGIOGRAM NECK FINDINGS:   Normal origin of the great vessels from the aortic arch.     Right carotid artery: The right common and internal carotid arteries  are patent. No significant stenosis or atherosclerotic disease in the  carotid artery.     Left carotid artery: The left common and internal carotid arteries are  patent. No significant stenosis or atherosclerotic disease in the  carotid artery.     Vertebral arteries: Vertebral arteries are patent without evidence of  dissection. No significant stenosis.     Other findings: Mild multilevel cervical spine degenerative changes  without high-grade neural foraminal narrowing or canal stenosis..       Impression    IMPRESSION:   1. Patent arteries in the head and neck without vascular cutoff. No  evidence of dissection. No aneurysm identified. No significant  stenosis.    Findings were discussed with Dr. Monroy telephone at 1:46 PM on  12/6/2023.     CECIL FRYE MD         SYSTEM ID:  [id number]   CT Head Perfusion w Contrast - For Tier 2 Stroke    Narrative    CT BRAIN PERFUSION 12/6/2023 1:37 PM    HISTORY: Headache; Neurologic deficit, non-traumatic; None of the  following: Altered mental status, language deficit, sensory loss,  visual symptoms, or weakness    TECHNIQUE: Time sequential axial CT images of the head were acquired  during the administration of 50 mL Isovue-370 IV. Color perfusion maps  of the brain were created from this time sequential axial source data.      Radiation dose for this scan was reduced using automated exposure  control,  adjustment of the mA and/or kV according to patient size, or  iterative reconstruction technique.    COMPARISON: None.    FINDINGS: Minimal increased time to drain, increased mean transit  time, and decreased cerebral blood flow in the right anterior frontal  and left lateral frontal lobes is noted but below the threshold for  RAPID analysis.    CBF<30% volume (mL): 0  Tmax>6.0s volume (mL): 0  Mismatch volume (mL): 0  Mismatch ratio: None      Impression    IMPRESSION:   1. No findings to suggest an acute infarct.  2. Very minimal qualitative asymmetry on time to drain, mean transit  time, and cerebral blood flow maps involving the right anterior  frontal lobe and left lateral frontal lobe. This did not meet criteria  for RAPID analysis. This may reflect low level hypoperfusion in these  areas.    Findings were discussed with Dr. Monroy telephone at 1:46 PM on  12/6/2023.     CECIL FRYE MD         SYSTEM ID:  [id number]   MR Brain w/o & w Contrast    Narrative    MRI BRAIN WITHOUT AND WITH CONTRAST  12/6/2023 4:26 PM     HISTORY:  speech deficits     TECHNIQUE:  Multiplanar, multisequence MRI of the brain without and  with 7mL Gadavist     COMPARISON: Same day CT     FINDINGS: Small focus of diffusion restriction in the right corpus  callosum and adjacent supraventricular white matter (series 6, image  67). This is immediately adjacent to a chronic infarct. Minimal  associated FLAIR signal abnormality. Extensive background chronic  microvascular ischemia with multiple prior basal ganglia, thalamic,  and corona radiata infarcts.    There is no evidence of acute hemorrhage, mass, or herniation. Mild  generalized parenchymal volume loss with associated ex vacuo  dilatation of the ventricles.     There is no abnormal intracranial enhancement.     Minimal scattered paranasal sinus mucous thickening. No mastoid or  middle ear effusion. The major arterial T2 flow voids at the base of  the brain appear patent.        Impression    IMPRESSION:    1. Small acute infarct in the right corpus callosum and adjacent  supraventricular white matter.  2. There is generalized atrophy of the brain. Extensive white matter  changes are present in the cerebral hemispheres that are consistent  with small vessel ischemic disease in this age patient.    Findings were discussed with Dr. Monroy via telephone at 4:36 PM on  12/6/2023.     CECIL FRYE MD         SYSTEM ID:  [id number]

## 2023-12-07 NOTE — PHARMACY-ADMISSION MEDICATION HISTORY
Pharmacist Admission Medication History    Admission medication history is complete. The information provided in this note is only as accurate as the sources available at the time of the update.    Information Source(s): Patient and CareEverywhere/SureScripts via in-person    Pertinent Information: Patient had some difficulty talking to me about medications. It was unclear due to language barrier or due to stroke symptoms. She states she takes 20 units of insulin in the morning with no food and then 15 units with meals. So I believe she takes Lantus 20 units in the AM and insulin lispro 15 units TID with meals per surescript information.   I called the family and confirmed medications.   She was also put on a Medrol Dospak and has 2 doses left.     Changes made to PTA medication list:  Added: all medications  Deleted: None  Changed: None      Allergies reviewed with patient and updates made in EHR: yes    Medication History Completed By: Richelle Mosley RPH 12/6/2023 7:27 PM    PTA Med List   Medication Sig Last Dose    insulin glargine (LANTUS PEN) 100 UNIT/ML pen Inject 20 Units Subcutaneous every morning 12/6/2023    insulin lispro (HUMALOG VIAL) 100 UNIT/ML vial Inject 15 Units Subcutaneous 3 times daily (before meals) 12/6/2023 at x1    metFORMIN (GLUCOPHAGE) 500 MG tablet Take 500 mg by mouth 2 times daily (with meals) 12/6/2023 at x1    methylPREDNISolone (MEDROL DOSEPAK) 4 MG tablet therapy pack Take 4 mg by mouth See Admin Instructions Follow Package Directions  Family states she has 2 tablets left 12/5/2023    NIFEdipine ER (ADALAT CC) 30 MG 24 hr tablet Take 30 mg by mouth daily 12/6/2023    omeprazole (PRILOSEC) 40 MG DR capsule Take 40 mg by mouth daily 12/6/2023

## 2023-12-07 NOTE — PROGRESS NOTES
Patient has been having on/off chest pain since admission.  Now pain is moderate, under left breast, radiating to the back.  RRT was called.    Addendum:  Nitroglycerin was administered; pain subsided.  She was hypotensive after administration of nitroglycerin; BP increased afterwards.   Pain is currently 0/10.

## 2023-12-07 NOTE — PROGRESS NOTES
[facility]    Hospitalist Progress Note    Interval History   Patient is awake and alert.  I had an RRT this morning when she complained of chest discomfort.  Please review below.  By the time I evaluated her her chest pain had completely resolved.  Speech is slow.  Denies any other acute complaints.  Very flat affect.    -Data reviewed today: I reviewed all new labs and imaging results over the last 24 hours. I personally reviewed the EKG tracing showing sinus rhythm.  CT chest angio for PE done reviewed below .    Physical Exam   Temp: 98.1  F (36.7  C) Temp src: Oral BP: (!) 151/94 Pulse: 107   Resp: 20 SpO2: 98 % O2 Device: None (Room air)    Vitals:    12/06/23 1509   Weight: 72.6 kg (160 lb)     Vital Signs with Ranges  Temp:  [97.6  F (36.4  C)-98.1  F (36.7  C)] 98.1  F (36.7  C)  Pulse:  [] 107  Resp:  [13-22] 20  BP: ()/() 151/94  SpO2:  [92 %-100 %] 98 %  I/O last 3 completed shifts:  In: 440 [P.O.:440]  Out: 1350 [Urine:1350]    Physical Exam  Constitutional:       Comments: Appears very fatigued.   Cardiovascular:      Rate and Rhythm: Regular rhythm. Tachycardia present.      Pulses: Normal pulses.      Heart sounds: Normal heart sounds.   Pulmonary:      Effort: Pulmonary effort is normal. No respiratory distress.      Breath sounds: Normal breath sounds.   Abdominal:      General: Abdomen is flat. Bowel sounds are normal. There is no distension.      Tenderness: There is no abdominal tenderness. There is no guarding.   Skin:     General: Skin is warm and dry.   Neurological:      General: No focal deficit present.      Comments: Slow with her speech but answering questions appropriately.  No other focal deficits evident at this time.  Gait could not be assessed.  Patient is very fatigued.           Medications    - MEDICATION INSTRUCTIONS -      - MEDICATION INSTRUCTIONS -        aspirin  81 mg Oral Daily    Or    aspirin  81 mg Oral or NG Tube Daily     atorvastatin  80 mg Oral QPM    clopidogrel  75 mg Oral or NG Tube Daily    insulin aspart  10 Units Subcutaneous TID AC    insulin aspart  1-7 Units Subcutaneous TID AC    insulin aspart  1-5 Units Subcutaneous At Bedtime    insulin glargine  20 Units Subcutaneous QAM    [START ON 12/8/2023] lidocaine  1 patch Transdermal Q24H    [Held by provider] NIFEdipine ER  30 mg Oral Daily    pantoprazole  40 mg Oral BID AC    sodium chloride (PF)  3 mL Intracatheter Q8H       Data   Recent Labs   Lab 12/07/23  1431 12/07/23  1302 12/07/23  1209 12/07/23  1105 12/06/23  2344 12/06/23  1339 12/06/23  1231   0000   WBC  --   --   --  7.0  --   --  8.0  --    HGB  --   --   --  13.9  --   --  14.0  --    MCV  --   --   --  89  --   --  89  --    PLT  --   --   --  267  --   --  273  --    INR  --   --   --   --   --  1.09  --   --    NA  --   --   --  135  --  135  --   --    POTASSIUM  --   --   --  3.7  --  4.3  --   --    CHLORIDE  --   --   --  99  --  97*  --   --    CO2  --   --   --  23  --  24  --   --    BUN  --   --   --  16.6  --  15.0  --   --    CR  --   --   --  0.71  --  0.66  --   --    ANIONGAP  --   --   --  13  --  14  --   --    FLACA  --   --   --  9.1  --  9.3  --   --    * 347* 437* 483*   < > 192*  --    < >    < > = values in this interval not displayed.       Recent Results (from the past 24 hour(s))   MR Brain w/o & w Contrast    Narrative    MRI BRAIN WITHOUT AND WITH CONTRAST  12/6/2023 4:26 PM     HISTORY:  speech deficits     TECHNIQUE:  Multiplanar, multisequence MRI of the brain without and  with 7mL Gadavist     COMPARISON: Same day CT     FINDINGS: Small focus of diffusion restriction in the right corpus  callosum and adjacent supraventricular white matter (series 6, image  67). This is immediately adjacent to a chronic infarct. Minimal  associated FLAIR signal abnormality. Extensive background chronic  microvascular ischemia with multiple prior basal ganglia, thalamic,  and corona  radiata infarcts.    There is no evidence of acute hemorrhage, mass, or herniation. Mild  generalized parenchymal volume loss with associated ex vacuo  dilatation of the ventricles.     There is no abnormal intracranial enhancement.     Minimal scattered paranasal sinus mucous thickening. No mastoid or  middle ear effusion. The major arterial T2 flow voids at the base of  the brain appear patent.       Impression    IMPRESSION:    1. Small acute infarct in the right corpus callosum and adjacent  supraventricular white matter.  2. There is generalized atrophy of the brain. Extensive white matter  changes are present in the cerebral hemispheres that are consistent  with small vessel ischemic disease in this age patient.    Findings were discussed with Dr. Monroy via telephone at 4:36 PM on  2023.     CECIL FRYE MD         SYSTEM ID:  [id number]   Echo Limited   Result Value    LVEF  60-65%    Narrative    [id number]  [id number]  [id number]  [id number]^SYLVIA^KIKO^MARCUS     [facility]  Echocardiography Laboratory  [address] [place] [address]     Name: ANA CHAPA  MRN: [id number]  : 1950  Study Date: 2023 10:27 AM  Age: 73 yrs  Gender: Female  Patient Location: [facility]  Reason For Study: Cerebrovascular Incident  Ordering Physician: KIKO WARD  Performed By: Shima Jeong     Weight: 160 lb  HR: 104  BP: 176/114 mmHg  ______________________________________________________________________________  Procedure  Limited Portable Echo Adult.     ______________________________________________________________________________  Interpretation Summary     1. Normal biventricular size and function. Left ventricular ejection fraction  of 60-65%.  2. No segmental wall motion abnormalities noted.  3. No hemodynamically significant valvular disease.  No prior study for comparison.  ______________________________________________________________________________  Left  Ventricle  The left ventricle is normal in size. There is normal left ventricular wall  thickness. Left ventricular systolic function is normal. The visual ejection  fraction is 60-65%. No regional wall motion abnormalities noted.     Right Ventricle  The right ventricle is normal in size and function.     Atria  Normal left atrial size. Right atrial size is normal.     Mitral Valve  The mitral valve leaflets appear normal. There is no evidence of stenosis,  fluttering, or prolapse. There is trace mitral regurgitation.     Aortic Valve  There is mild trileaflet aortic sclerosis. There is trace aortic  regurgitation.     Pulmonic Valve  The pulmonic valve is not well visualized.     Vessels  Normal size aorta. IVC diameter <2.1 cm collapsing >50% with sniff suggests a  normal RA pressure of 3 mmHg.     Pericardium  There is no pericardial effusion.     Rhythm  Sinus rhythm was noted.     ______________________________________________________________________________  MMode/2D Measurements & Calculations  asc Aorta Diam: 3.1 cm     ______________________________________________________________________________  Report approved by: Olga Persaud 12/07/2023 11:30 AM         XR Chest Port 1 View    Narrative    CHEST ONE VIEW  12/7/2023 11:42 AM     HISTORY: Chest pain evaluation.    COMPARISON: None available.      Impression    IMPRESSION: Slightly low lung volumes. No focal consolidation, pleural  effusion or pneumothorax. Aorta appears slightly tortuous.  Cardiomediastinal silhouette is otherwise unremarkable given AP  technique.     IMMANUEL LI MD         SYSTEM ID:  [id number]   CT Chest Pulmonary Embolism w Contrast    Narrative    CT CHEST PULMONARY EMBOLISM WITH CONTRAST 12/7/2023 2:24 PM    CLINICAL HISTORY: Stroke, headache, slurred speech. Evaluation for PE.    TECHNIQUE: CT angiogram chest during arterial phase injection IV  contrast. 2D and 3D MIP reconstructions were performed by the  CT  technologist. Dose reduction techniques were used.     CONTRAST: 63 mL Isovue-370    COMPARISON: None.    FINDINGS:  ANGIOGRAM CHEST: Pulmonary arteries are normal caliber and negative  for pulmonary emboli. Thoracic aorta is negative for dissection. No CT  evidence of right heart strain.    LUNGS AND PLEURA: No infiltrates. No effusions.    MEDIASTINUM/AXILLAE: No adenopathy or aneurysm.    CORONARY ARTERY CALCIFICATIONS: None.    UPPER ABDOMEN: No acute findings.    MUSCULOSKELETAL: L1 compression deformity with near complete loss of  height. This is age indeterminate.      Impression    IMPRESSION:  1.  No pulmonary embolism demonstrated.  2.  Age indeterminate L1 compression deformity with near complete loss  of height.    JAYCEE TAYLOR MD         SYSTEM ID:  [id number]         Assessment & Plan      Angelika Quiroz is a 73 year old female with reported past medical history significant for stroke- though no hx is available in the EMR admitted on 12/6/2023 with headache and slurred speech. She is found to have a small ischemic stroke.      Acute ischemic infarct   Pt presents to the ED with headache and slurred and stuttering speech. She was also limping on her right leg. Her daughter reports she had a prior stroke and has some residual mild facial droop as a result. CT head and CTA of the head and neck were unremarkable. MRI of the brain did show a small acute infarct in the right corpus callosum and adjacent supraventricular white matter..   - Admit to inpatient   - Neurochecks and Vital Signs every 4 hours   - Permissive HTN; goal SBP < 220 mmHg  - Daily aspirin 81 mg for secondary stroke prevention  - Plavix (clopidogrel) 300 mg PO loading dose x 1  - Plavix (clopidogrel) 75 mg PO Daily  - Statin: High intensity  - TTE showed EF of 60 to 65% with no regional wall motion abnormalities or valvular disease.  - Telemetry, EKG  - Bedside Glucose Monitoring  -Troponin negative.  Lipid panel shows LDL of 127 with  total cholesterol at 210.  HbA1c at 7.7.  - PT/OT/SLP done.  Speech clear for diet.  - Stroke Education  - Euthermia, Euglycemia    Chest pain  -Patient was complaining of significant chest pain this morning and an RRT was called.    -Troponin normal at 8.    -EKG was at baseline with sinus tachycardia.    - D-dimer was significantly elevated at 8.7.    - CT chest angio for PE was done which was negative for pulmonary embolism or pulmonary findings.  No aortic dissection.  Age-indeterminate L1 compression deformity with near complete loss of height.  -COVID-negative  -Patient did receive one-time dose of nitro with which she had a significant drop in blood pressure that eventually recovered.  Chest pain spontaneously resolved.  Unclear if nitro helped.  Will continue on cardiac monitoring for now and will avoid any further nitro use especially in light of stroke.     Type II DM  - Continue PTA lantus 20 units daily  -Mealtime aspart 15 units.  Was started at 10 but patient has been persistently hyperglycemic today.  - MDSSI   -HbA1c at 7.7.  - Hold PTA metformin for now     HTN  - Hold PTA Nifedipine to allow for permissive hypertension.      Acute Grief Response  Unfortunately, the patient's daughter  this past weekend.      Mechanical fall   Right sided abdominal/flank pain   Left leg pain   Pt's son reports she had a fall several months ago and has had some pain in her R side and left leg since then. She has been evaluated through the [facility] system for this.      Diet:    DVT Prophylaxis: Pneumatic Compression Devices  Landin Catheter: Not present  Lines: None     Cardiac Monitoring: None  Code Status: Full Code      Clinically Significant Risk Factors Present on Admission                   # Hypertension: Home medication list includes antihypertensive(s)                 Clinically Significant Risk Factors Present on Admission                      # DMII: A1C = 7.7 % (Ref range: <5.7 %) within past 6 months                Malou Cook MD, MD  [phone](p)

## 2023-12-07 NOTE — PROGRESS NOTES
"Clinical Swallow Evaluation (CSE):     12/07/23 0915   Appointment Info   Signing Clinician's Name / Credentials (SLP) Ila Babin MS CCC-SLP   General Information   Onset of Illness/Injury or Date of Surgery 12/06/23   Referring Physician Erin Ventura MD   Patient/Family Therapy Goal Statement (SLP) Pt did not state   Pertinent History of Current Problem   Per H&P \"Angelika Quiroz is a 73 year old female with reported past medical history significant for stroke- though no hx is available in the EMR admitted on 12/6/2023 with headache and slurred speech. She is found to have a small ischemic stroke.\" MRI: \"Small acute infarct in the right corpus callosum and adjacent  supraventricular white matter.\" NPO until SLP given facial droop     General Observations Pt alert, upright in bed, son present       Present yes   Language Twi   Pain Assessment   Patient Currently in Pain No   Type of Evaluation   Type of Evaluation Swallow Evaluation   Oral Motor   Oral Musculature anomalies present   Structural Abnormalities none present   Mucosal Quality good   Dentition (Oral Motor)   Dentition (Oral Motor) natural dentition;adequate dentition   Facial Symmetry (Oral Motor)   Facial Symmetry (Oral Motor) right side impairment  (min)   Lip Function (Oral Motor)   Lip Range of Motion (Oral Motor) WNL   Tongue Function (Oral Motor)   Tongue ROM (Oral Motor) protrusion is impaired  (min deviation to the L)   Jaw Function (Oral Motor)   Jaw Function (Oral Motor) WNL   Cough/Swallow/Gag Reflex (Oral Motor)   Soft Palate/Velum (Oral Motor) WNL   Volitional Throat Clear/Cough (Oral Motor) WNL   Volitional Swallow (Oral Motor) WNL   Vocal Quality/Secretion Management (Oral Motor)   Vocal Quality (Oral Motor) WNL   Secretion Management (Oral Motor) WNL   General Swallowing Observations   Past History of Dysphagia N/A per pt/son or EMR   Respiratory Support room air   Current Diet/Method of Nutritional Intake " (General Swallowing Observations, NIS) NPO   Swallowing Evaluation Clinical swallow evaluation   Clinical Swallow Evaluation   Feeding Assistance no assistance needed   Clinical Swallow Evaluation Textures Trialed thin liquids;pureed;solid foods   Clinical Swallow Eval: Thin Liquid Texture Trial   Mode of Presentation, Thin Liquids cup;straw;self-fed   Volume of Liquid or Food Presented 4 oz   Oral Phase of Swallow premature pharyngeal entry   Pharyngeal Phase of Swallow impaired;coughing/choking   Diagnostic Statement cough x1 following thin liquids via straw, no signs/sx via cup   Clinical Swallow Evaluation: Puree Solid Texture Trial   Mode of Presentation, Puree spoon;self-fed   Volume of Puree Presented 2 oz   Oral Phase, Puree WFL   Pharyngeal Phase, Puree intact   Clinical Swallow Evaluation: Solid Food Texture Trial   Mode of Presentation self-fed   Volume Presented 1.5 marija crackers   Oral Phase WFL   Pharyngeal Phase impaired;feeling of something stuck in throat   Diagnostic Statement pt reports mild sticking sensation with marija crackers - liquid wash clears and pt IND with same every 1-3 bites   Esophageal Phase of Swallow   Esophageal comments sticking sensation could be pharygeal vs esophageal   Swallowing Recommendations   Diet Consistency Recommendations easy to chew (level 7);thin liquids (level 0)   Supervision Level for Intake distant supervision needed   Medication Administration Recommendations, Swallowing (SLP) whole as tolerated   Instrumental Assessment Recommendations   (VFSS if concerns)   General Therapy Interventions   Planned Therapy Interventions Dysphagia Treatment   Clinical Impression   Criteria for Skilled Therapeutic Interventions Met (SLP Eval) Yes, treatment indicated   SLP Diagnosis minimal oral and potential pharyngeal dysphagia   Risks & Benefits of therapy have been explained evaluation/treatment results reviewed;care plan/treatment goals reviewed;risks/benefits  reviewed;current/potential barriers reviewed;participants voiced agreement with care plan;participants included;patient;son   Clinical Impression Comments   Clinical swallow evaluation completed with thin liquids, puree solids, regular solids - pt currently presents with minimal oral and potential pharyngeal dysphagia. Mastication and oral clearance appear complete with min increased time. Notable laryngeal elevation to palpation, robust in ROM. Pt reports mild sticking sensation wtih dry cracker, IND using liquid wash every 1-3 bites which she reports clears sensation. Cough x1 with straw drinking, no other overt clinical signs/sx aspiration noted. Given min deficits, rec easy to chew solids.     SLP Total Evaluation Time   Eval: oral/pharyngeal swallow function, clinical swallow Minutes ([id number]) 18   SLP Goals   Therapy Frequency (SLP Eval) daily   SLP Predicted Duration/Target Date for Goal Attainment 12/14/23   SLP Goals Swallow   SLP: Safely tolerate diet without signs/symptoms of aspiration Regular diet;Thin liquids;With use of swallow precautions;Independently   SLP Discharge Planning   SLP Plan PO tolerance/ADAT regular; S/L assessment   SLP Discharge Recommendation home with outpatient therapy services   SLP Rationale for DC Rec Anticipate swallow goals will be met while IP, anticipate OP speech/language follow up needs   SLP Brief overview of current status  Easy to chew solids, thin liquids when upright, liquid wash PRN   Total Session Time   Total Session Time (sum of timed and untimed services) 18

## 2023-12-07 NOTE — PLAN OF CARE
[id number]  Pt here with small acute Ischemic stroke. A&O to self, confuse. Neuros are slight slow/slurred speech from previous stroke. VSS, SBP<220. Tele NSR. NPO . Up with A2/lift, stayed in bed all shift. Denies pain. Pt scoring green on the Aggression Stop Light Tool.  Discharge pending.

## 2023-12-07 NOTE — CODE/RAPID RESPONSE
[facility]    RRT Note  12/7/2023   Time Called: 10:31 AM    Code Status: Full Code    I was called to evaluate Angelika Quiroz, who is a 73 year old female who was admitted on 12/6/2023 for     Assessment & Plan     Acute Chest Pain suspect 2/2 ACS vs. PE vs. MSK vs. GERD  Sinus tachycardia suspect 2/2 possible PE  Elevated D-dimer suspect 2/2 possible PE  Upon arrival patient is lying supine, not in acute respiratory distress.  She does appear to be uncomfortable.  Patient is finishing up with echocardiogram at bedside.   was used during this evaluation.  Patient reports having intermittent chest pain since admission yesterday.  Initial troponin negative.  Twelve-lead EKG done on admission was not remarkable for acute ischemic changes.  Initial vital signs include , /80, RR 22, SpO2 94% on room air.  On exam skin is warm and dry. Oral mucosa moist. Lungs are clear, diminished in bilateral bases.  HR tachycardic, regular.  No murmur, rub, or gallop.  No peripheral edema.  Chest pain is reproducible.  Patient also endorses left flank pain with palpation.  Patient denies dysuria, urinary urgency or frequency.  UA yesterday unremarkable not concerning for UTI.    Differentials considered include ACS; patient endorsed chest pain on admission, however troponin was negative.  Echo today shows normal LV function, EF 60-65%. No regional wall motion abnormalities. Will repeat troponin.  Patient tachycardic in the 110s.  Denies pleuritic chest pain.  Will proceed with D-dimer to evaluate for PE.  Other differentials considered include MSK, anxiety, Takotsubo cardiomyopathy, pneumothorax, pleural effusion, pneumonia, and aortic dissection.     Working diagnosis: Chest pain 2/2. ACS vs, MSK    INTERVENTIONS:  -12-lead EKG  - CXR: negative for consolidation, pleural effusion, or pneumothorax   - troponin 8  - PRN GI cocktail ordered, but not given as chest pain resolved after  nitroglycerin.  - d-dimer; elevated at 8.7  - Proceed with CT PE Chest for further evaluation of PE  - Patient not tested for COVID on admission; obtain COVID-19  PCR      After receiving nitroglycerin patient's pain resolved, however her BP decreased to 90/69. Patient asymptomatic. SBP improved to 118/73 on next recheck. Patient can remain on Neuroscience unit. Discussed with and defer further cares to Dr. Cook.     Addendum 2:40 PM: CT PE negative, no evidence of thoracic aortic dissection. Flying james RN rounded on patient, and reports patient remains chest pain free.     Eder Light NP  [facility]  Securely message with the Vocera Web Console (learn more here)  Text page via [employer] Paging/Directory          Physical Exam   Vital Signs with Ranges:  Temp:  [97.6  F (36.4  C)-98.1  F (36.7  C)] 98.1  F (36.7  C)  Pulse:  [] 109  Resp:  [13-58] 20  BP: (152-201)/() 169/98  SpO2:  [92 %-100 %] 92 %  I/O last 3 completed shifts:  In: -   Out: 550 [Urine:550]        Physical Exam   EXAM:   Nursing Notes Reviewed.  /73 (BP Location: Right arm)   Pulse 118   Temp 98.1  F (36.7  C) (Oral)   Resp 20   Wt 72.6 kg (160 lb)   SpO2 96%    General:  Non-toxic appearing, however does appear uncomfortable. No acute distress. A&O x 3.  Skin:  Warm, dry. No rashes or lesions on exposed skin.  HEENT:  Normocephalic, atraumatic.  Neck:  Supple.  Chest:  Breath sounds CTA, diminished in bilateral bases. No increased work of breathing on room air.  Cardiovascular:  RRR, no rub or murmur. No peripheral edema.  Abdomen:  Soft, non-tender, non-distended, obese.  Musculoskeletal:  Moves all four extremities.   Neurological:  No new focal neurological deficits. Known dysarthria.   Psychiatric:  Affect and mood congruent.    Data     EKG:  Interpreted by Eder Light NP  Time reviewed: 10:37 AM  Symptoms at time of EKG: Chest Pain   Rhythm: sinus tachycardia  Rate: 109  Axis:  Normal  Ectopy: none  Conduction: normal  ST Segments/ T Waves: Non-specific ST-T wave changes  Q Waves: anterior leads  Comparison to prior: Non-specific T-wave abnormality worse in lateral leads    Clinical Impression: sinus tachycardia; possible anterior infarct age undetermined    IMAGING: (X-ray/CT/MRI)   Recent Results (from the past 24 hour(s))   CT Head w/o Contrast    Narrative    CT SCAN OF THE HEAD WITHOUT CONTRAST   12/6/2023 1:09 PM     HISTORY: Headache; Neurologic deficit, non-traumatic; speech  difficulty    TECHNIQUE:  Axial images of the head and coronal reformations without  IV contrast material. Radiation dose for this scan was reduced using  automated exposure control, adjustment of the mA and/or kV according  to patient size, or iterative reconstruction technique.    COMPARISON: None.    FINDINGS: There is no evidence of intracranial hemorrhage, mass, acute  infarct or anomaly. The ventricles are normal in size, shape and  configuration. Mild diffuse parenchymal volume loss, within normal  limits for age. Extensive patchy periventricular white matter  hypodensities which are nonspecific, but likely related to chronic  microvascular ischemic disease. Small bilateral thalamic and corona  radiata lacunar infarcts.    The visualized portions of the sinuses and mastoids appear normal. The  bony calvarium and bones of the skull base appear intact.       Impression    IMPRESSION:   No evidence of acute territorial infarct. Extensive  sequela of chronic microvascular ischemia somewhat limits evaluation  for small infarcts. Consider MRI if there is further clinical concern.    Findings were discussed with Dr. Monroy telephone at 1:46 PM on  12/6/2023.     CECIL FRYE MD         SYSTEM ID:  [id number]   CTA Head Neck with Contrast    Narrative    CT ANGIOGRAM OF THE HEAD AND NECK WITH CONTRAST  12/6/2023 1:12 PM     HISTORY: Code Stroke to evaluate for potential thrombolysis and  thrombectomy. Speech  difficulty.    TECHNIQUE:  CT angiography with an injection of 67mL Isovue-370 IV  with scans through the head and neck. Images were transferred to a  separate 3-D workstation where multiplanar reformations and 3-D images  were created. Estimates of carotid stenoses are made relative to the  distal internal carotid artery diameters except as noted. Radiation  dose for this scan was reduced using automated exposure control,  adjustment of the mA and/or kV according to patient size, or iterative  reconstruction technique.    COMPARISON: None.     CT HEAD FINDINGS: No contrast enhancing lesions. Cerebral blood flow  is grossly normal.     CT ANGIOGRAM HEAD FINDINGS:  Scattered bilateral carotid siphon  atherosclerotic calcifications without luminal narrowing. The major  intracranial arteries including the proximal branches of the anterior  cerebral, middle cerebral, and posterior cerebral arteries appear  patent without vascular cutoff. No aneurysm identified. No significant  stenosis. Normal variant fetal origin of the left posterior cerebral  artery. Venous circulation is unremarkable.     CT ANGIOGRAM NECK FINDINGS:   Normal origin of the great vessels from the aortic arch.     Right carotid artery: The right common and internal carotid arteries  are patent. No significant stenosis or atherosclerotic disease in the  carotid artery.     Left carotid artery: The left common and internal carotid arteries are  patent. No significant stenosis or atherosclerotic disease in the  carotid artery.     Vertebral arteries: Vertebral arteries are patent without evidence of  dissection. No significant stenosis.     Other findings: Mild multilevel cervical spine degenerative changes  without high-grade neural foraminal narrowing or canal stenosis..       Impression    IMPRESSION:   1. Patent arteries in the head and neck without vascular cutoff. No  evidence of dissection. No aneurysm identified. No  significant  stenosis.    Findings were discussed with Dr. Monroy telephone at 1:46 PM on  12/6/2023.     CECIL FRYE MD         SYSTEM ID:  [id number]   CT Head Perfusion w Contrast - For Tier 2 Stroke    Narrative    CT BRAIN PERFUSION 12/6/2023 1:37 PM    HISTORY: Headache; Neurologic deficit, non-traumatic; None of the  following: Altered mental status, language deficit, sensory loss,  visual symptoms, or weakness    TECHNIQUE: Time sequential axial CT images of the head were acquired  during the administration of 50 mL Isovue-370 IV. Color perfusion maps  of the brain were created from this time sequential axial source data.      Radiation dose for this scan was reduced using automated exposure  control, adjustment of the mA and/or kV according to patient size, or  iterative reconstruction technique.    COMPARISON: None.    FINDINGS: Minimal increased time to drain, increased mean transit  time, and decreased cerebral blood flow in the right anterior frontal  and left lateral frontal lobes is noted but below the threshold for  RAPID analysis.    CBF<30% volume (mL): 0  Tmax>6.0s volume (mL): 0  Mismatch volume (mL): 0  Mismatch ratio: None      Impression    IMPRESSION:   1. No findings to suggest an acute infarct.  2. Very minimal qualitative asymmetry on time to drain, mean transit  time, and cerebral blood flow maps involving the right anterior  frontal lobe and left lateral frontal lobe. This did not meet criteria  for RAPID analysis. This may reflect low level hypoperfusion in these  areas.    Findings were discussed with Dr. Monroy telephone at 1:46 PM on  12/6/2023.     CECIL FRYE MD         SYSTEM ID:  [id number]   MR Brain w/o & w Contrast    Narrative    MRI BRAIN WITHOUT AND WITH CONTRAST  12/6/2023 4:26 PM     HISTORY:  speech deficits     TECHNIQUE:  Multiplanar, multisequence MRI of the brain without and  with 7mL Gadavist     COMPARISON: Same day CT     FINDINGS: Small focus of diffusion  "restriction in the right corpus  callosum and adjacent supraventricular white matter (series 6, image  67). This is immediately adjacent to a chronic infarct. Minimal  associated FLAIR signal abnormality. Extensive background chronic  microvascular ischemia with multiple prior basal ganglia, thalamic,  and corona radiata infarcts.    There is no evidence of acute hemorrhage, mass, or herniation. Mild  generalized parenchymal volume loss with associated ex vacuo  dilatation of the ventricles.     There is no abnormal intracranial enhancement.     Minimal scattered paranasal sinus mucous thickening. No mastoid or  middle ear effusion. The major arterial T2 flow voids at the base of  the brain appear patent.       Impression    IMPRESSION:    1. Small acute infarct in the right corpus callosum and adjacent  supraventricular white matter.  2. There is generalized atrophy of the brain. Extensive white matter  changes are present in the cerebral hemispheres that are consistent  with small vessel ischemic disease in this age patient.    Findings were discussed with Dr. Monroy via telephone at 4:36 PM on  12/6/2023.     CECIL FRYE MD         SYSTEM ID:  [id number]       CBC with Diff:  Recent Labs   Lab Test 12/06/23  1339 12/06/23  1231   WBC  --  8.0   HGB  --  14.0   MCV  --  89   PLT  --  273   INR 1.09  --       No results found for: \"RETICABSCT\"  No results found for: \"RETP\"    Comprehensive Metabolic Panel:  Recent Labs   Lab 12/07/23  0751 12/06/23  2344 12/06/23  1339   NA  --   --  135   POTASSIUM  --   --  4.3   CHLORIDE  --   --  97*   CO2  --   --  24   ANIONGAP  --   --  14   *   < > 192*   BUN  --   --  15.0   CR  --   --  0.66   GFRESTIMATED  --   --  >90   FLACA  --   --  9.3    < > = values in this interval not displayed.         Time Spent on this Encounter     CRITICAL CARE TIME  I spent 45 minutes of critical care time on the unit/floor managing the care of Angelika Quiroz. Upon evaluation, " this patient had a high probability of imminent or life-threatening deterioration due to Acute Chest Pain which required my direct attention, intervention, and personal management. 100% of my time was spent at the bedside counseling the patient and/or coordinating care regarding services listed in this note.

## 2023-12-07 NOTE — PROGRESS NOTES
Patient is AO X 4; permissive hypertensive is in place.   Chest CT is negative for PE.   Hyperglycemic, doctor was notified.   On RA; denies nausea, vomiting, SOB, numbness and tingling.

## 2023-12-07 NOTE — PROGRESS NOTES
12/07/23 1600   Appointment Info   Signing Clinician's Name / Credentials (PT) Rubi Fox, PT, DPT       Present yes   Language Akan language, [place]   Living Environment   Living Environment Comments Pt lives with son and dtr in law in apt with stairs to negotiate.   Self-Care   Usual Activity Tolerance moderate   Current Activity Tolerance fair   Equipment Currently Used at Home   (walking stick)   Fall history within last six months yes   Number of times patient has fallen within last six months 1   General Information   Onset of Illness/Injury or Date of Surgery 12/06/23   Referring Physician Erin Ventura MD   Patient/Family Therapy Goals Statement (PT) Return to PLOF, go home   Pertinent History of Current Problem (include personal factors and/or comorbidities that impact the POC) Pt is a 73 year old female with reported past medical history significant for stroke- though no hx is available in the EMR admitted on 12/6/2023 with headache and slurred speech. She is found to have a small ischemic stroke.  Pt presented to the ED with headache and slurred and stuttering speech. She was also limping on her right leg. Her daughter reports she had a prior stroke and has some residual mild facial droop as a result. CT head and CTA of the head and neck were unremarkable. MRI of the brain did show a small acute infarct in the right corpus callosum and adjacent supraventricular white matter..   Existing Precautions/Restrictions fall   Cognition   Orientation Status (Cognition) oriented x 4   Follows Commands (Cognition) follows two-step commands   Cognitive Status Comments Difficult to assess cognition 2/2 language impairments.   Pain Assessment   Patient Currently in Pain No   Integumentary/Edema   Integumentary/Edema no deficits were identifed   Posture    Posture Forward head position;Protracted shoulders;Kyphosis   Range of Motion (ROM)   Range of Motion ROM is WFL    Strength (Manual Muscle Testing)   Strength Comments Grossly deconditioned, grossly 4+/5 B LEs   Bed Mobility   Bed Mobility supine-sit   Comment, (Bed Mobility) CGA   Transfers   Comment, (Transfers) SST w/Venecia   Gait/Stairs (Locomotion)   Comment, (Gait/Stairs) Amb w/2WW and CGA   Balance   Balance Comments Needs 1-2 UE support   Sensory Examination   Sensory Perception WFL   Coordination   Coordination no deficits were identified   Muscle Tone   Muscle Tone no deficits were identified   Clinical Impression   Criteria for Skilled Therapeutic Intervention Yes, treatment indicated   PT Diagnosis (PT) Impaired indep w/fn mobility   Influenced by the following impairments Strength, cardiovasc endurance, balance   Functional limitations due to impairments Indep w/transfers, amb, stairs   Clinical Presentation (PT Evaluation Complexity) evolving   Clinical Presentation Rationale Multiple affecting comorbidities, assessment incl strength, balance, fn mob.   Clinical Decision Making (Complexity) moderate complexity   Planned Therapy Interventions (PT) balance training;bed mobility training;gait training;patient/family education;neuromuscular re-education;stair training;strengthening;stretching;transfer training   PT Total Evaluation Time   PT Eval, Moderate Complexity Minutes (80227) 5   Physical Therapy Goals   PT Frequency Daily   PT Predicted Duration/Target Date for Goal Attainment 12/17/23   PT Goals Bed Mobility;Transfers;Gait;Stairs   PT: Bed Mobility Modified independent   PT: Transfers Modified independent   PT: Gait Supervision/stand-by assist   PT: Stairs Minimal assist   Interventions   Interventions Quick Adds Therapeutic Activity   Therapeutic Activity   Therapeutic Activities: dynamic activities to improve functional performance Minutes (52666) 26   Symptoms Noted During/After Treatment Fatigue   Treatment Detail/Skilled Intervention Isaias focused on indep and safety w/transfers and amb. Pt completed  sup>sit w/CGA, needed VCs via  for bringing feet flat on flloor. Pt  completed sit>stand and amb into bathroom w/2WW and Venecia. Mild R LE dragging noted with turning. Completed sit<>stand and pericares at toilet with Venecia. Amb into hallway but session limited by needing to down for further imaging. Completed short distance ambulation into hallway with 2WW, improving somewhat in straight path anb w/2WW and CGA, no further noticeable R leg dragging. Able to pivot w/CGA and get into transport bed with CGA. Tolerated well.   PT Discharge Planning   PT Plan Progress indep w/transfers, amb, trial stairs   PT Discharge Recommendation (DC Rec) home with assist;Transitional Care Facility;home with home care physical therapy   PT Rationale for DC Rec Pt with mild weakness noted functionally w/R LE with initial ambulation, grossly needing steadying assistance w/transfers and ambulation. Has stairs to navigate into apartment, will continue to assess. Anticipate pt can d/c home with Ax1 and 2WW. If consistent assistance is not available, pt might benefit from TCU level of care to progress indep prior to returning to home env.   PT Brief overview of current status Ax1 w/2WW   PT Equipment Needed at Discharge walker, rolling   Total Session Time   Timed Code Treatment Minutes 26   Total Session Time (sum of timed and untimed services) 31

## 2023-12-07 NOTE — PHARMACY
Pharmacy Consult to evaluate for medication related stroke core measures    Angelika Quiroz, 73 year old female admitted for CVA on 12/6/2023.    Thrombolytic was not given because of Time from onset contraindications    VTE Prophylaxis SCDs /PCDs placed on 1, as appropriate prior to end of hospital day 2.    Antithrombotic: aspirin and plavix started on 1, as appropriate by end of hospital day 2. Continue antithrombotic therapy on discharge to meet quality measures, unless contraindicated.    Anticoagulation if history of A-fib/flutter: Patient does not have history of A-fib/flutter - anticoagulation not required for medication related stroke core measures.     LDL Cholesterol Calculated   Date Value Ref Range Status   12/06/2023 127 (H) <=100 mg/dL Final        Statin: High intensity recommended by neuro team.    Recommendations: None at this time    Thank you for the consult.    Xochlit Cano [facility] 12/7/2023 7:57 AM

## 2023-12-07 NOTE — PROGRESS NOTES
"   23 1015   Appointment Info   Signing Clinician's Name / Credentials (OT) Jerri Turner OTR/L       Present yes  (iPad .)   Language Twi   Living Environment   People in Home   (daughter in law)   Current Living Arrangements apartment   Self-Care   Usual Activity Tolerance moderate   Current Activity Tolerance fair   Equipment Currently Used at Home   (cane)   Fall history within last six months yes   Number of times patient has fallen within last six months 1  (per EMR. Occurred multiple months ago)   Activity/Exercise/Self-Care Comment Unknown. Eval short in duration as pt had chest pain and RRT. WIth pt permission, attempted to call pt daughter in law x2 to obtain PLOF and received no answer. Pt reports she typically ambulates with a cane   General Information   Onset of Illness/Injury or Date of Surgery 23   Referring Physician Erin Ventura MD   Patient/Family Therapy Goal Statement (OT) None stated   Additional Occupational Profile Info/Pertinent History of Current Problem Per H&P \"Angelika Quiroz is a 73 year old female with reported past medical history significant for stroke- though no hx is available in the EMR admitted on 2023 with headache and slurred speech. She is found to have a small ischemic stroke.\" MRI: \"Small acute infarct in the right corpus callosum and adjacent  supraventricular white matter.\" Angelika Quiroz is a 73 year old female who presents to the ED with slurred speech. Unfortunately the patient's daughter  this past weekend. The patient has been grief stricken for the past two days, and not doing much. Today she Woke up and reported she couldn't see anything. Her daughter in law noticed today that her words were slurred   Existing Precautions/Restrictions fall   Cognitive Status Examination   Affect/Mental Status (Cognitive) WFL   Follows Commands follows two-step commands   Cognitive Status Comments  used. pt " soft spoken   Visual Perception   Visual Impairment/Limitations corrective lenses full-time   Impact of Vision Impairment on Function (Vision) Pt denies vision changes   Pain Assessment   Patient Currently in Pain Yes, see Vital Sign flowsheet   Strength Comprehensive (MMT)   Comment, General Manual Muscle Testing (MMT) Assessment Grossly 4/5 MMT in BUEs   Coordination   Coordination Comments age related slowed BUE gross and fine   Transfers   Transfers toilet transfer;sit-stand transfer   Toilet Transfer   Nelson Level (Toilet Transfer) minimum assist (75% patient effort)   Toilet Transfer Comments per clinical judgment   Activities of Daily Living   BADL Assessment/Intervention lower body dressing;toileting;bathing   Bathing Assessment/Intervention   Nelson Level (Bathing) moderate assist (50% patient effort)   Comment, (Bathing) per clinical judgment   Lower Body Dressing Assessment/Training   Comment, (Lower Body Dressing) per clinical judgment   Nelson Level (Lower Body Dressing) moderate assist (50% patient effort)   Toileting   Comment, (Toileting) per clinical judgment   Nelson Level (Toileting) moderate assist (50% patient effort)   Clinical Impression   Criteria for Skilled Therapeutic Interventions Met (OT) Yes, treatment indicated   OT Diagnosis Decline function   OT Problem List-Impairments impacting ADL activity tolerance impaired;mobility;pain;strength;balance;cognition;motor control;coordination   Assessment of Occupational Performance 3-5 Performance Deficits   Identified Performance Deficits LB dressing, toileting, bathing, functional mobility, grooming   Planned Therapy Interventions (OT) ADL retraining;home program guidelines;progressive activity/exercise;transfer training;cognition   Clinical Decision Making Complexity (OT) problem focused assessment/low complexity   Risk & Benefits of therapy have been explained evaluation/treatment results reviewed;care plan/treatment  goals reviewed;risks/benefits reviewed;current/potential barriers reviewed;participants voiced agreement with care plan;participants included;patient   OT Total Evaluation Time   OT Eval, Low Complexity Minutes (93296) 25   OT Goals   Therapy Frequency (OT) Daily   OT Predicted Duration/Target Date for Goal Attainment 12/17/23   OT Goals Toilet Transfer/Toileting;Lower Body Dressing;Hygiene/Grooming;OT Goal 1   OT: Hygiene/Grooming supervision/stand-by assist;while standing   OT: Lower Body Dressing Minimal assist   OT: Toilet Transfer/Toileting Supervision/stand-by assist   OT: Goal 1 Pt will verbalize understanding of a safe plan for bathing at hospital discharge   OT Discharge Planning   OT Plan Assess visual perception. Monitor cog. Obtain PLOF information. Self cares in standing   OT Discharge Recommendation (DC Rec) home with assist  (Will continue to assess)   OT Rationale for DC Rec Eval very limited as pt had chest pain during session. Attempted to contact family for PLOF information x2 and received no answer. Pt currently requiring assist of 1 for self-cares. Currently at discharge recommend pt have Ax1 with self-cares, shower chair, raised toilet seat, grab bars. If this level of assist cannot be provided then recommend TCU   OT Brief overview of current status Transferred to EOB independently. /69. When asked about pain, pt reporting pain under breast and radiating to back. Sit to supine independently. Assist to boost in bed. Handoff to ECHO tech and RN.   OT Equipment Needed at Discharge shower chair;raised toilet seat  (grab bars)   Total Session Time   Total Session Time (sum of timed and untimed services) 25

## 2023-12-07 NOTE — CONSULTS
"      [facility]    Stroke Consult Note    Reason for Consult:  stroke     Chief Complaint: Headache and Slurred Speech       HPI  Angelika Quiroz is a 73 year old female with history of HTN, DM2, prior stroke (records not available) who was brought to the ED 12/6 for evaluation of abnormal language. Over the weekend, her daughter passed away. Since then, she has not been eating or sleeping well.  On 12/5, it was first noticed that her speech pattern seemed abnormal. On waking 12/6, her speech was described as \"stuttering\" and she was talking quieter than usual.  She was also noted to have an abnormal gait, but was not clearly weak in any extremity.     Today, she had an RRT for chest pain and tachycardia. CT chest PE was negative.     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 Discussed the importance of home BP monitoring and keeping a log for PCP.  - Therapies, as needed  - Stroke education  - Discharge with 30 day cardiac event monitor to evaluate for atrial fibrillation (ordered)  - Should not use Prilosec while taking Plavix as it can reduce effectiveness. Prevacid or Pepcid okay. " "      Patient Follow-up    - final recommendation pending work-up    Thank you for this consult. We will continue to follow.     Merlene Carlos, CNP  Vascular Neurology    To page me or covering stroke neurology team member, click here: AMCOM  Choose \"On Call\" tab at top, then select \"NEUROLOGY/ALL SITES\" from middle drop-down box, press Enter, then look for \"stroke\" or \"telestroke\" for your site.  ___________________________________  "

## 2023-12-07 NOTE — PROGRESS NOTES
RECEIVING UNIT ED HANDOFF REVIEW    ED Nurse Handoff Report was reviewed by: Anan Ng RN on December 6, 2023 at 11:00 PM

## 2023-12-08 ENCOUNTER — APPOINTMENT (OUTPATIENT)
Dept: CARDIOLOGY | Facility: CLINIC | Age: 73
End: 2023-12-08
Attending: NURSE PRACTITIONER
Payer: COMMERCIAL

## 2023-12-08 ENCOUNTER — APPOINTMENT (OUTPATIENT)
Dept: PHYSICAL THERAPY | Facility: CLINIC | Age: 73
End: 2023-12-08
Payer: COMMERCIAL

## 2023-12-08 VITALS
DIASTOLIC BLOOD PRESSURE: 101 MMHG | WEIGHT: 160 LBS | TEMPERATURE: 97.7 F | OXYGEN SATURATION: 98 % | RESPIRATION RATE: 14 BRPM | HEART RATE: 99 BPM | SYSTOLIC BLOOD PRESSURE: 169 MMHG

## 2023-12-08 LAB
GLUCOSE BLDC GLUCOMTR-MCNC: 218 MG/DL (ref 70–99)
GLUCOSE BLDC GLUCOMTR-MCNC: 258 MG/DL (ref 70–99)
GLUCOSE BLDC GLUCOMTR-MCNC: 305 MG/DL (ref 70–99)
GLUCOSE BLDC GLUCOMTR-MCNC: 429 MG/DL (ref 70–99)

## 2023-12-08 PROCEDURE — 93270 REMOTE 30 DAY ECG REV/REPORT: CPT

## 2023-12-08 PROCEDURE — 93272 ECG/REVIEW INTERPRET ONLY: CPT | Performed by: INTERNAL MEDICINE

## 2023-12-08 PROCEDURE — 99223 1ST HOSP IP/OBS HIGH 75: CPT | Mod: FS

## 2023-12-08 PROCEDURE — 99239 HOSP IP/OBS DSCHRG MGMT >30: CPT | Performed by: HOSPITALIST

## 2023-12-08 PROCEDURE — 250N000013 HC RX MED GY IP 250 OP 250 PS 637: Performed by: HOSPITALIST

## 2023-12-08 PROCEDURE — 250N000013 HC RX MED GY IP 250 OP 250 PS 637: Performed by: STUDENT IN AN ORGANIZED HEALTH CARE EDUCATION/TRAINING PROGRAM

## 2023-12-08 PROCEDURE — 97530 THERAPEUTIC ACTIVITIES: CPT | Mod: GP | Performed by: PHYSICAL THERAPIST

## 2023-12-08 PROCEDURE — 250N000013 HC RX MED GY IP 250 OP 250 PS 637

## 2023-12-08 PROCEDURE — 99418 PROLNG IP/OBS E/M EA 15 MIN: CPT | Mod: FS

## 2023-12-08 PROCEDURE — 99207 PR APP CREDIT; MD BILLING SHARED VISIT: CPT | Performed by: HOSPITALIST

## 2023-12-08 RX ORDER — ASPIRIN 325 MG
325 TABLET, DELAYED RELEASE (ENTERIC COATED) ORAL DAILY
Qty: 90 TABLET | Refills: 3 | Status: SHIPPED | OUTPATIENT
Start: 2023-12-30

## 2023-12-08 RX ORDER — ATORVASTATIN CALCIUM 80 MG/1
80 TABLET, FILM COATED ORAL EVERY EVENING
Qty: 90 TABLET | Refills: 3 | Status: SHIPPED | OUTPATIENT
Start: 2023-12-08

## 2023-12-08 RX ORDER — NIFEDIPINE 30 MG/1
30 TABLET, EXTENDED RELEASE ORAL DAILY
Status: DISCONTINUED | OUTPATIENT
Start: 2023-12-08 | End: 2023-12-08 | Stop reason: HOSPADM

## 2023-12-08 RX ORDER — ASPIRIN 81 MG/1
81 TABLET ORAL DAILY
Qty: 21 TABLET | Refills: 0 | Status: SHIPPED | OUTPATIENT
Start: 2023-12-09

## 2023-12-08 RX ORDER — CLOPIDOGREL BISULFATE 75 MG/1
75 TABLET ORAL DAILY
Qty: 21 TABLET | Refills: 0 | Status: SHIPPED | OUTPATIENT
Start: 2023-12-09 | End: 2023-12-30

## 2023-12-08 RX ADMIN — INSULIN GLARGINE 20 UNITS: 100 INJECTION, SOLUTION SUBCUTANEOUS at 08:42

## 2023-12-08 RX ADMIN — CLOPIDOGREL BISULFATE 75 MG: 75 TABLET ORAL at 08:39

## 2023-12-08 RX ADMIN — LIDOCAINE 1 PATCH: 4 PATCH TOPICAL at 08:41

## 2023-12-08 RX ADMIN — NIFEDIPINE 30 MG: 30 TABLET, FILM COATED, EXTENDED RELEASE ORAL at 14:03

## 2023-12-08 RX ADMIN — PANTOPRAZOLE SODIUM 40 MG: 40 TABLET, DELAYED RELEASE ORAL at 08:39

## 2023-12-08 RX ADMIN — ASPIRIN 81 MG: 81 TABLET, COATED ORAL at 08:39

## 2023-12-08 ASSESSMENT — ACTIVITIES OF DAILY LIVING (ADL)
ADLS_ACUITY_SCORE: 37
ADLS_ACUITY_SCORE: 37
ADLS_ACUITY_SCORE: 38
ADLS_ACUITY_SCORE: 38
DEPENDENT_IADLS:: CLEANING;COOKING;LAUNDRY;SHOPPING;TRANSPORTATION;MONEY MANAGEMENT
ADLS_ACUITY_SCORE: 37
ADLS_ACUITY_SCORE: 38

## 2023-12-08 NOTE — PROGRESS NOTES
Care Management Discharge Note    Discharge Date: 12/08/2023       Discharge Disposition: Home, Home Care    Discharge Services:      Discharge DME:      Discharge Transportation:      Private pay costs discussed: Not applicable    Does the patient's insurance plan have a 3 day qualifying hospital stay waiver?  No    PAS Confirmation Code:    Patient/family educated on Medicare website which has current facility and service quality ratings:      Education Provided on the Discharge Plan:    Persons Notified of Discharge Plans: Pt/bedside RN  Patient/Family in Agreement with the Plan:      Handoff Referral Completed: No    Additional Information:  Pt discharge to home with family.  Pt was accepted for HHC by [facility].    Updated [facility] Intake of discharge today.  They note orders can be faxed to [phone].  Message left for Mary Grace  in intake.    Previous directions from [facility] had indicated to email orders/discharge summary  to [email or web address]  This was also done.  Information added to AVS    Bedside RN to review AVS and coordinate ride with family.     Nancy Ross RN

## 2023-12-08 NOTE — PROCEDURES
"      [facility]    Stroke Progress Note    Interval Events  Reviewed ongoing secondary stroke prevention plan which includes taking aspirin 81 mg and Plavix 75 mg daily for 21 days, then discontinuing Plavix and continuing taking aspirin 325 mg indefinitely.  Encouraged beginning daily at home blood pressure monitoring, recording a BP log, and bring the results to her PCP in 1 to 2 weeks after discharge. Discussed need for daily statin to help lower LDL. Discussed need for heart monitor to evaluate for any underlying abnormal rhythm, including atrial fibrillation.    Discussed recent loss of her daughter and offered my condolences. I offered a referral for outpatient mental health management and/or grief counseling and at this time the patient declined.  She reported that she has adequate support through her son and daughter-in-law at home.    Patient reported persistent speech changes as she notes her speech is different from her baseline as it is more slurred and her speech is much softer. I encouraged her that this would improve with continued outpatient speech therapy.    Used [employer]  during examination and patient discussion.     HPI Summary  Angelika Quiroz is a 73 year old female with history of HTN, DM2, prior stroke (records not available) who was brought to the ED 12/6 for evaluation of abnormal language. Over the weekend, her daughter passed away. Since then, she has not been eating or sleeping well.  On 12/5, it was first noticed that her speech pattern seemed abnormal. On waking on 12/6, her speech was described as \"stuttering\" and she was talking quieter than usual.  She was also noted to have an abnormal gait, but was not clearly weak in any extremity. Not on ASA PTA.     Yesterday, she had an RRT for chest pain and tachycardia. CT chest PE was negative.     Stroke Evaluation Summarized  MRI/Head CT MRI with acute infarct in the R corpus callosum   Intracranial " Vasculature CTA head unremarkable   Cervical Vasculature CTA neck unremarkable      Echocardiogram EF 60-65%, Normal left atrial size. Right atrial size is normal.  No segmental wall motion abnormalities noted. Sinus rhythm was noted.    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 Discussed the importance of home BP monitoring and keeping a log for PCP.   - Therapies, as needed  - Bedside Glucose Monitoring  - Euthermia, Euglycemia     Diagnostic testing  - Continue telemetry while inpatient  - Check 30 day CardioNet monitor on discharge to screen for atrial fibrillation (ordered)    Secondary stroke prevention  - Continue Plavix 75 mg and aspirin 81mg daily together for 21 days; then aspirin 325 mg alone indefinitely  - Should not use Prilosec while taking Plavix as it can reduce effectiveness. Prevacid or Pepcid okay.   -  (goal 40-70); initiate Lipitor 80 mg with ongoing outpatient titration to achieve LDL goal   - Blood glucose monitoring, Hgb A1c 7.7%, (goal <7% for secondary stroke prevention), follow-up with PCP regarding diabetes management  - Education: Stroke education   - Encourage Mediterranean diet and daily exercise  - Offered outpatient mental health evaluation or grief consulting but patient declined at this time. Patient is able to connect with me via [email or web address] or through her PCP if needed in the future.      Patient Follow-up    - in the next 1-2 week(s) with PCP  - in 6-8 weeks with Merlene Carlos CNP or Sheron Mercado PA-C ([phone]) (ordered)    No further stroke  "evaluation is recommended, so we will sign off. Please contact us with any additional questions.    Sheron Mercado PA-C  Vascular Neurology    To page me or covering stroke neurology team member, click here: AMCOM  Choose \"On Call\" tab at top, then select \"NEUROLOGY/ALL SITES\" from middle drop-down box, press Enter, then look for \"stroke\" or \"telestroke\" for your site.  ______________________________________________________    Clinically Significant Risk Factors                        # DMII: A1C = 7.7 % (Ref range: <5.7 %) within past 6 months, PRESENT ON ADMISSION               Medications   Scheduled Meds   aspirin  81 mg Oral Daily    Or    aspirin  81 mg Oral or NG Tube Daily    atorvastatin  80 mg Oral QPM    clopidogrel  75 mg Oral or NG Tube Daily    insulin aspart  15 Units Subcutaneous TID AC    insulin aspart  1-7 Units Subcutaneous TID AC    insulin aspart  1-5 Units Subcutaneous At Bedtime    insulin glargine  20 Units Subcutaneous QAM    lidocaine  1 patch Transdermal Q24H    [Held by provider] NIFEdipine ER  30 mg Oral Daily    pantoprazole  40 mg Oral BID AC    sodium chloride (PF)  3 mL Intracatheter Q8H       Infusion Meds   - MEDICATION INSTRUCTIONS -      - MEDICATION INSTRUCTIONS -         PRN Meds  acetaminophen, maalox/mylanta w/ simethicone, glucose **OR** dextrose **OR** glucagon, lidocaine 4%, lidocaine (buffered or not buffered), - MEDICATION INSTRUCTIONS -, - MEDICATION INSTRUCTIONS -, melatonin, nitroGLYcerin, sodium chloride (PF)       PHYSICAL EXAMINATION  Temp:  [97.7  F (36.5  C)-98.4  F (36.9  C)] 98.4  F (36.9  C)  Pulse:  [] 81  Resp:  [14-22] 14  BP: ()/() 147/109  SpO2:  [92 %-98 %] 95 %      General Exam  General:  patient sitting in the chair without any acute distress    HEENT:  normocephalic/atraumatic  Pulmonary:  no respiratory distress    Neuro Exam  Mental Status:  alert, oriented self, situation, age and month, follows commands, mild dysarthria, soft " "speech, naming normal (able to name pen, and what to do with it \"write\")  Cranial Nerves:  visual fields intact, EOMI with normal smooth pursuit, facial sensation intact and symmetric, facial movements symmetric, hearing not formally tested but intact to conversation, tongue protrusion midline  Motor:  normal muscle tone and bulk, no abnormal movements, able to move all limbs spontaneously, no pronator drift  Sensory:  light touch sensation intact and symmetric throughout upper and lower extremities, no extinction on double simultaneous stimulation   Coordination:  normal finger-to-nose and heel-to-shin bilaterally without dysmetria  Station/Gait:  deferred    Imaging  I personally reviewed all imaging; relevant findings per HPI.     Lab Results Data   CBC  Recent Labs   Lab 12/07/23  1105 12/06/23  1231   WBC 7.0 8.0   RBC 4.65 4.72   HGB 13.9 14.0   HCT 41.2 42.1    273     Basic Metabolic Panel    Recent Labs   Lab 12/08/23  0751 12/08/23  0206 12/07/23  2200 12/07/23  1209 12/07/23  1105 12/06/23  2344 12/06/23  1339   NA  --   --   --   --  135  --  135   POTASSIUM  --   --   --   --  3.7  --  4.3   CHLORIDE  --   --   --   --  99  --  97*   CO2  --   --   --   --  23  --  24   BUN  --   --   --   --  16.6  --  15.0   CR  --   --   --   --  0.71  --  0.66   * 218* 305*   < > 483*   < > 192*   FLACA  --   --   --   --  9.1  --  9.3    < > = values in this interval not displayed.     Liver Panel  No results for input(s): \"PROTTOTAL\", \"ALBUMIN\", \"BILITOTAL\", \"ALKPHOS\", \"AST\", \"ALT\", \"BILIDIRECT\" in the last 168 hours.  INR    Recent Labs   Lab Test 12/06/23  1339   INR 1.09      Lipid Profile    Recent Labs   Lab Test 12/06/23  1339   CHOL 210*   HDL 69   *   TRIG 69     A1C    Recent Labs   Lab Test 12/06/23  1231   A1C 7.7*     Troponin    Recent Labs   Lab 12/07/23  1105 12/06/23  1339   CTROPT 8 8          Data   I have personally spent a total of 60 minutes providing care today, time spent " in reviewing medical records and reviewing tests, examining the patient and obtaining history, coordination of care, and discussion with the patient and/or family regarding diagnostic results, prognosis, symptom management, risks and benefits of management options, and development of plan of care. Greater than 50% was spent in counseling and coordination of care.

## 2023-12-08 NOTE — PLAN OF CARE
Summary: R embolic stroke.   Orientation: A&Ox4 although doesn't always fully comprehend instructions even with    Diet: Easy to chew, thins  Activity: A2 gb/w or joel steady   Vitals: VSS on RA ex permissive htn  PIV: SL  Neuros: mild tongue deviation to the R, slow and quiet speech, flat affect (daughter  over the weekend)  GI/: continent, Pt requesting purewick due to weakness but encouraging patient to ambulate to BR.   Respiratory: WNL  Abnormal labs: BG in 300's  Procedure or tests: US of BLE. No dvt   Plan: TBD  Other details:

## 2023-12-08 NOTE — PROGRESS NOTES
Care Management Follow Up    Length of Stay (days): 2    Expected Discharge Date: 12/09/2023     Concerns to be Addressed:       Patient plan of care discussed at interdisciplinary rounds: Yes    Anticipated Discharge Disposition: Home, Home Care     Anticipated Discharge Services:    Anticipated Discharge DME:      Patient/family educated on Medicare website which has current facility and service quality ratings:    Education Provided on the Discharge Plan:    Patient/Family in Agreement with the Plan:      Referrals Placed by CM/SW: Homecare, Scheduled Follow-up appointments  Private pay costs discussed: Not applicable    Additional Information:  Following for discharge planning.  Updated from [facility] that they were outsourcing due to payer.  Accepted by [facility], please provide an order for SOC on Monday and discharge summary. It needs to be emailed as their fax does not work. [email or web address]    MD updated.  Expect discharge orders this afternoon.   CC to follow.     Nancy Ross RN

## 2023-12-08 NOTE — PROGRESS NOTES
Discharge  Angelika Quiroz is an 73 year old female who presented with expressive aphasia with right-sided stroke     Pt discharge medication was given to pt. Discharged home with  family.

## 2023-12-08 NOTE — PLAN OF CARE
Speech Language Therapy Discharge Summary    Reason for therapy discharge:    Discharged to home with home therapy.    Progress towards therapy goal(s). See goals on Care Plan in [facility] electronic health record for goal details.  Goals not met.  Barriers to achieving goals:   discharge from facility.    Therapy recommendation(s):    Continued therapy is recommended.  Rationale/Recommendations:  Continue ST in HH vs OP per MD.

## 2023-12-08 NOTE — DISCHARGE INSTRUCTIONS
Follow up with Primary MD  Monday December 11th 1:15pm  for hospital follow up.    Ena Angelpadminiaguilar [phone], [place] [address]   Bring Insurance card and co pay.      Your risk factors for stroke or TIA (transient ischemic attack):    Your Risk Factors Your Results Normal Ranges   High blood pressure BP Readings from Last 1 Encounters:   12/08/23 (!) 169/101    Less than 120/80   Cholesterol              Total Lab Results   Component Value Date    CHOL 210 12/06/2023      Less than 150    Triglycerides   Lab Results   Component Value Date    TRIG 69 12/06/2023    Less than 150   LDL Lab Results   Component Value Date     12/06/2023       Less than 70   HDL Lab Results   Component Value Date    HDL 69 12/06/2023            Greater than 40 (men)  Greater than 50 (women)   Diabetes Recent Labs   Lab 12/08/23  1236   *    Fasting blood glucose    Smoking/tobacco use  Quit smoking and tobacco   Overweight  Lose 1-2 pounds a week   Lack of exercise  30 minutes moderate activity each day   Other risk factors include carotid (neck) artery disease, atrial fibrillation and stress. You may be on new medicine to treat high blood pressure, cholesterol, diabetes or atrial fibrillation.    Understanding Stroke Booklet given to patient. Please refer to booklet for further information.    Stroke warning signs and symptoms - CALL 911 right away for:  - Sudden numbness or weakness in the face, arm or leg (often on one side of the body).  - Sudden confusion or trouble understanding what is going on.  - Sudden blurred or decreased vision in one or both eyes.  - Sudden trouble speaking, loss of balance, dizziness or problems with coordination.  - Sudden, severe headache for no reason.  - Fainting or seizures.  - Symptoms may go away then come back suddenly.

## 2023-12-08 NOTE — CONSULTS
Care Management Initial Consult    General Information  Assessment completed with: Patient, Children, Daughter in law Twila  Type of CM/SW Visit: Offer D/C Planning    Primary Care Provider verified and updated as needed: Yes   Readmission within the last 30 days:        Reason for Consult: discharge planning  Advance Care Planning:            Communication Assessment  Patient's communication style: spoken language (English or Bilingual)             Cognitive  Cognitive/Neuro/Behavioral: .WDL except, speech  Level of Consciousness: alert  Arousal Level: opens eyes spontaneously  Orientation: oriented x 4  Mood/Behavior: calm, cooperative  Best Language: 0 - No aphasia  Speech:  (slow)    Living Environment:   People in home: child(guanako), adult     Current living Arrangements: apartment      Able to return to prior arrangements: yes       Family/Social Support:  Care provided by: self, child(guanako)  Provides care for: no one  Marital Status:              Description of Support System:           Current Resources:   Patient receiving home care services:       Community Resources:    Equipment currently used at home:  (walking stick)  Supplies currently used at home:      Employment/Financial:  Employment Status: retired        Financial Concerns:             Does the patient's insurance plan have a 3 day qualifying hospital stay waiver?  No    Lifestyle & Psychosocial Needs:  Social Determinants of Health     Food Insecurity: Not on file   Depression: Not on file   Housing Stability: Not on file   Tobacco Use: Not on file   Financial Resource Strain: Not on file   Alcohol Use: Not on file   Transportation Needs: Not on file   Physical Activity: Not on file   Interpersonal Safety: Not on file   Stress: Not on file   Social Connections: Not on file       Functional Status:  Prior to admission patient needed assistance:   Dependent ADLs:: Independent  Dependent IADLs:: Cleaning, Cooking, Laundry, Shopping,  Transportation, Money Management       Mental Health Status:          Chemical Dependency Status:                Values/Beliefs:  Spiritual, Cultural Beliefs, [employer] Practices, Values that affect care:                 Additional Information:  Met with Patient and daughter in law Twila in room.  Daughter interpreted as unable to find twi on jabber.  MD present for 1st part of conversation as we discussed discharge plan for today.     Pt lives with son/DIL in apartment.  Pt was indep at baseline.  Son works during the day, DIL at night so patient is not left alone at all.  DIL does cooking/cleaning though Pt will help out at times.  Pt was indep in ADL's, showers, mobility. Reviewed recommendation for home w/ HHC.  DIL is in agreement with this and feels they can help out/manage at home.  May have difficulty finding HHC due to [facility] insurance.  If not HHC can be found DIL is agreeable to OP PT/OT/SLP.  Family can transport.   Follow up made with PCP (Dr. Angel at [facility]) and added to [facility]  Follow up with Primary MD  Monday December 11th 1:15pm  for hospital follow up.    Ena Angel [phone] [address], [place] [address]   Bring Insurance card and co pay.      Referral sent to [facility] for RN/PT/OT/SLP for HHC.   Await acceptance and then will update MD.     Nancy Ross, RN

## 2023-12-08 NOTE — PLAN OF CARE
Occupational Therapy Discharge Summary    Reason for therapy discharge:    Discharged to home with home therapy.    Progress towards therapy goal(s). See goals on Care Plan in [facility] electronic health record for goal details.  Goals partially met.  Barriers to achieving goals:   discharge from facility.    Therapy recommendation(s):    Continued therapy is recommended.  Rationale/Recommendations:   Pt currently requiring assist of 1 for self-cares. Currently at discharge recommend pt have Ax1 with self-cares, shower chair, raised toilet seat, grab bars. If this level of assist cannot be provided then recommend TCU.

## 2023-12-08 NOTE — PROGRESS NOTES
Slept well overnight. Neuros unchanged - slow speech, and tongue deviating to right. Continues to be very fatigued. Requires . Denies pain. Voiding adequately. VSS on room air except continues to be hypertensive. Ambulating assist of 1 with walker. PT recommending discharge home if she has adequate assistance, otherwise TCU.

## 2023-12-08 NOTE — PROGRESS NOTES
[facility]    Hospitalist Progress Note    Interval History   Patient is awake and alert.  Sitting in the chair.  Her primary language is TWI.  Daughter-in-law at bedside and acted as the .  Patient continues to have some expressive aphasia and is frustrated about this but otherwise looking much better than yesterday and eating well.    -Data reviewed today: I reviewed all new labs and imaging results over the last 24 hours. I personally reviewed the EKG tracing showing sinus rhythm.  CT chest angio for PE done reviewed below .    Physical Exam   Temp: 97.7  F (36.5  C) Temp src: Oral BP: (!) 169/101 Pulse: 99   Resp: 14 SpO2: 98 % O2 Device: None (Room air)    Vitals:    12/06/23 1509   Weight: 72.6 kg (160 lb)     Vital Signs with Ranges  Temp:  [97.7  F (36.5  C)-98.4  F (36.9  C)] 97.7  F (36.5  C)  Pulse:  [] 99  Resp:  [14-20] 14  BP: (147-177)/() 169/101  SpO2:  [95 %-98 %] 98 %  I/O last 3 completed shifts:  In: 680 [P.O.:680]  Out: 1400 [Urine:1400]    Physical Exam  Constitutional:       Comments: Appears very fatigued.   Cardiovascular:      Rate and Rhythm: Regular rhythm. Tachycardia present.      Pulses: Normal pulses.      Heart sounds: Normal heart sounds.   Pulmonary:      Effort: Pulmonary effort is normal. No respiratory distress.      Breath sounds: Normal breath sounds.   Abdominal:      General: Abdomen is flat. Bowel sounds are normal. There is no distension.      Tenderness: There is no abdominal tenderness. There is no guarding.   Skin:     General: Skin is warm and dry.   Neurological:      General: No focal deficit present.      Comments: Slow with her speech but answering questions appropriately.  No other focal deficits evident at this time.  Gait could not be assessed.  Patient is very fatigued.           Medications    - MEDICATION INSTRUCTIONS -      - MEDICATION INSTRUCTIONS -        aspirin  81 mg Oral Daily    Or    aspirin  81 mg  Oral or NG Tube Daily    atorvastatin  80 mg Oral QPM    clopidogrel  75 mg Oral or NG Tube Daily    insulin aspart  15 Units Subcutaneous TID AC    insulin aspart  1-7 Units Subcutaneous TID AC    insulin aspart  1-5 Units Subcutaneous At Bedtime    insulin glargine  20 Units Subcutaneous QAM    lidocaine  1 patch Transdermal Q24H    [Held by provider] NIFEdipine ER  30 mg Oral Daily    pantoprazole  40 mg Oral BID AC    sodium chloride (PF)  3 mL Intracatheter Q8H       Data   Recent Labs   Lab 12/08/23  1236 12/08/23  1050 12/08/23  0751 12/07/23  1209 12/07/23  1105 12/06/23  2344 12/06/23  1339 12/06/23  1231   0000   WBC  --   --   --   --  7.0  --   --  8.0  --    HGB  --   --   --   --  13.9  --   --  14.0  --    MCV  --   --   --   --  89  --   --  89  --    PLT  --   --   --   --  267  --   --  273  --    INR  --   --   --   --   --   --  1.09  --   --    NA  --   --   --   --  135  --  135  --   --    POTASSIUM  --   --   --   --  3.7  --  4.3  --   --    CHLORIDE  --   --   --   --  99  --  97*  --   --    CO2  --   --   --   --  23  --  24  --   --    BUN  --   --   --   --  16.6  --  15.0  --   --    CR  --   --   --   --  0.71  --  0.66  --   --    ANIONGAP  --   --   --   --  13  --  14  --   --    FLACA  --   --   --   --  9.1  --  9.3  --   --    * 305* 258*   < > 483*   < > 192*  --    < >    < > = values in this interval not displayed.       Recent Results (from the past 24 hour(s))   CT Chest Pulmonary Embolism w Contrast    Narrative    CT CHEST PULMONARY EMBOLISM WITH CONTRAST 12/7/2023 2:24 PM    CLINICAL HISTORY: Stroke, headache, slurred speech. Evaluation for PE.    TECHNIQUE: CT angiogram chest during arterial phase injection IV  contrast. 2D and 3D MIP reconstructions were performed by the CT  technologist. Dose reduction techniques were used.     CONTRAST: 63 mL [id number]-[id number]    COMPARISON: None.    FINDINGS:  ANGIOGRAM CHEST: Pulmonary arteries are normal caliber and negative  for  pulmonary emboli. Thoracic aorta is negative for dissection. No CT  evidence of right heart strain.    LUNGS AND PLEURA: No infiltrates. No effusions.    MEDIASTINUM/AXILLAE: No adenopathy or aneurysm.    CORONARY ARTERY CALCIFICATIONS: None.    UPPER ABDOMEN: No acute findings.    MUSCULOSKELETAL: L1 compression deformity with near complete loss of  height. This is age indeterminate.      Impression    IMPRESSION:  1.  No pulmonary embolism demonstrated.  2.  Age indeterminate L1 compression deformity with near complete loss  of height.    JAYCEE TAYLOR MD         SYSTEM ID:  [id number]   US Lower Extremity Venous Duplex Bilateral    Narrative    EXAM: US LOWER EXTREMITY VENOUS DUPLEX BILATERAL  LOCATION: [facility]  DATE: 12/7/2023    INDICATION: Bilateral lower extremity pain, swelling and edema.  COMPARISON: None.  TECHNIQUE: Venous Duplex ultrasound of bilateral lower extremities with and without compression, augmentation and duplex. Color flow and spectral Doppler with waveform analysis performed.    FINDINGS: Exam includes the common femoral, femoral, popliteal veins as well as segmentally visualized deep calf veins and greater saphenous vein.     RIGHT: No deep vein thrombosis. No superficial thrombophlebitis. No popliteal cyst.    LEFT: No deep vein thrombosis. No superficial thrombophlebitis. No popliteal cyst.      Impression    IMPRESSION:  1.  No deep venous thrombosis in the bilateral lower extremities.         Assessment & Plan      Angelika Quiroz is a 73 year old female with reported past medical history significant for stroke- though no hx is available in the EMR admitted on 12/6/2023 with headache and slurred speech. She is found to have a small ischemic stroke.      Acute ischemic infarct   Pt presents to the ED with headache and slurred and stuttering speech. She was also limping on her right leg. Her daughter reports she had a prior stroke and has some residual mild  facial droop as a result. CT head and CTA of the head and neck were unremarkable. MRI of the brain did show a small acute infarct in the right corpus callosum and adjacent supraventricular white matter..   - Admit to inpatient   - Neurochecks and Vital Signs every 4 hours   - Permissive HTN; goal SBP < 220 mmHg  - Daily aspirin 81 mg for secondary stroke prevention  - Plavix (clopidogrel) 300 mg PO loading dose x 1  - Plavix (clopidogrel) 75 mg PO Daily  -Atorvastatin 80 mg daily  - TTE showed EF of 60 to 65% with no regional wall motion abnormalities or valvular disease.  - Telemetry, EKG  - Bedside Glucose Monitoring  -Troponin negative.  Lipid panel shows LDL of 127 with total cholesterol at 210.  HbA1c at 7.7.  - PT/OT/SLP done.  Speech clear for diet.  -Recommending home PT OT and speech.  -Care coordinator sent out referrals.  Awaiting acceptance prior to discharge.  Medically stable for discharge at this time.  -30-day event monitor to be ordered at the time of discharge.  - Stroke Education  - Euthermia, Euglycemia    Chest pain  -Patient was complaining of significant chest pain this morning and an RRT was called.    -Troponin normal at 8.    -EKG was at baseline with sinus tachycardia.    - D-dimer was significantly elevated at 8.7.    - CT chest angio for PE was done which was negative for pulmonary embolism or pulmonary findings.  No aortic dissection.  Age-indeterminate L1 compression deformity with near complete loss of height.  -COVID-negative  -Patient did receive one-time dose of nitro with which she had a significant drop in blood pressure that eventually recovered.  Chest pain spontaneously resolved.  Unclear if nitro helped.  Will continue on cardiac monitoring for now and will avoid any further nitro use especially in light of stroke.     Type II DM  - Continue PTA lantus 20 units daily  -Mealtime aspart 15 units.  Was started at 10 but patient has been persistently hyperglycemic today.  - MDSSI    -HbA1c at 7.7.  - Hold PTA metformin for now     HTN  -Restarted home nifedipine after allowing for permissive hypertension for 48 hours.     Acute Grief Response  Unfortunately, the patient's daughter  this past weekend.      Mechanical fall   Right sided abdominal/flank pain   Left leg pain   Pt's son reports she had a fall several months ago and has had some pain in her R side and left leg since then. She has been evaluated through the [facility] system for this.      Diet:    DVT Prophylaxis: Pneumatic Compression Devices  Landin Catheter: Not present  Lines: None     Cardiac Monitoring: None  Code Status: Full Code      Clinically Significant Risk Factors Present on Admission                   # Hypertension: Home medication list includes antihypertensive(s)                 Clinically Significant Risk Factors                        # DMII: A1C = 7.7 % (Ref range: <5.7 %) within past 6 months, PRESENT ON ADMISSION             Malou Cook MD, MD  [phone](p)

## 2023-12-08 NOTE — DISCHARGE SUMMARY
[facility]    Discharge Summary  Hospitalist    Date of Admission:  12/6/2023  Date of Discharge:  12/8/2023    Discharge Diagnoses   Acute ischemic stroke (H)    History of Present Illness   Angelika Quiroz is an 73 year old female who presented with expressive aphasia with right-sided stroke    Hospital Course   Angelika Quiroz was admitted on 12/6/2023.  The following problems were addressed during her hospitalization:    Angelika Quiroz is a 73 year old female with reported past medical history significant for stroke- though no hx is available in the EMR admitted on 12/6/2023 with headache and slurred speech. She is found to have a small ischemic stroke.      Acute ischemic infarct in the right corpus callosum and supraventricular white matter  Expressive aphasia  Hypertension  Pt presents to the ED with headache and slurred and stuttering speech. She was also limping on her right leg. Her daughter reports she had a prior stroke and has some residual mild facial droop as a result. CT head and CTA of the head and neck were unremarkable. MRI of the brain did show a small acute infarct in the right corpus callosum and adjacent supraventricular white matter..   - Admit to inpatient   - Neurochecks and Vital Signs every 4 hours   - Permissive HTN; goal SBP < 220 mmHg  - Daily aspirin 81 mg for secondary stroke prevention  - Plavix (clopidogrel) 300 mg PO loading dose x 1  - Plavix (clopidogrel) 75 mg PO Daily  -Atorvastatin 80 mg daily  - TTE showed EF of 60 to 65% with no regional wall motion abnormalities or valvular disease.  - Telemetry, EKG  - Bedside Glucose Monitoring  -Troponin negative.  Lipid panel shows LDL of 127 with total cholesterol at 210.  HbA1c at 7.7.  - PT/OT/SLP done.  Speech clear for diet.  -Recommending home PT OT and speech.  -Care coordinator sent out referrals.  Awaiting acceptance prior to discharge.  Medically stable for discharge at this time.  -30-day event  monitor to be ordered at the time of discharge.  - Stroke Education  - Euthermia, Euglycemia  -Plan on aspirin 81 with Plavix 75 for 21 days followed by aspirin 325 mg daily.  -Home PT OT and speech therapy ordered.    Chest pain  -Patient was complaining of significant chest pain this morning and an RRT was called.    -Troponin normal at 8.    -EKG was at baseline with sinus tachycardia.    - D-dimer was significantly elevated at 8.7.    - CT chest angio for PE was done which was negative for pulmonary embolism or pulmonary findings.  No aortic dissection.  Age-indeterminate L1 compression deformity with near complete loss of height.  -COVID-negative  -Patient did receive one-time dose of nitro with which she had a significant drop in blood pressure that eventually recovered.  Chest pain spontaneously resolved.  Unclear if nitro helped.  Will continue on cardiac monitoring for now and will avoid any further nitro use especially in light of stroke.     Type II DM  - Continue PTA lantus 20 units daily  -Mealtime aspart 15 units.  Was started at 10 but patient has been persistently hyperglycemic today.  - MDSSI   -HbA1c at 7.7.  - Hold PTA metformin for now     HTN  -Restarted home nifedipine after allowing for permissive hypertension for 48 hours.     Acute Grief Response  Unfortunately, the patient's daughter  this past weekend.      Mechanical fall   Right sided abdominal/flank pain   Left leg pain   CT chest showing L1 compression deformity with near complete loss of height.  Pt's son reports she had a fall several months ago and has had some pain in her R side and left leg since then. She has been evaluated through the [facility] system for this.   Denies any significant back pain at this time.     Diet:    DVT Prophylaxis: Pneumatic Compression Devices  Landin Catheter: Not present  Lines: None     Cardiac Monitoring: None  Code Status: Full Code      Clinically Significant Risk Factors Present on Admission                    # Hypertension: Home medication list includes antihypertensive(s)                  Clinically Significant Risk Factors                        # DMII: A1C = 7.7 % (Ref range: <5.7 %) within past 6 months, PRESENT ON ADMISSION              Malou Cook MD, MD        Code Status   Full Code       Primary Care Physician   Ena Pinasyd Stanley    Physical Exam   Temp: 97.7  F (36.5  C) Temp src: Oral BP: (!) 169/101 Pulse: 99   Resp: 14 SpO2: 98 % O2 Device: None (Room air)    Vitals:    12/06/23 1509   Weight: 72.6 kg (160 lb)     Vital Signs with Ranges  Temp:  [97.7  F (36.5  C)-98.4  F (36.9  C)] 97.7  F (36.5  C)  Pulse:  [] 99  Resp:  [14-20] 14  BP: (147-177)/() 169/101  SpO2:  [95 %-98 %] 98 %  I/O last 3 completed shifts:  In: 680 [P.O.:680]  Out: 1400 [Urine:1400]    Physical Exam  Constitutional:       Appearance: Normal appearance.   Cardiovascular:      Rate and Rhythm: Normal rate and regular rhythm.      Pulses: Normal pulses.      Heart sounds: Normal heart sounds.   Pulmonary:      Effort: Pulmonary effort is normal. No respiratory distress.      Breath sounds: Normal breath sounds.   Abdominal:      General: Abdomen is flat. Bowel sounds are normal. There is no distension.      Tenderness: There is no abdominal tenderness. There is no guarding.   Skin:     General: Skin is warm and dry.   Neurological:      General: No focal deficit present.      Comments: Expressive aphasia            Discharge Disposition   Discharged to home  Condition at discharge: Stable    Consultations This Hospital Stay   NEUROLOGY IP STROKE CONSULT  SPEECH LANGUAGE PATH ADULT IP CONSULT  PHARMACY IP CONSULT  PHARMACY IP CONSULT  PHARMACY IP CONSULT  PHYSICAL THERAPY ADULT IP CONSULT  OCCUPATIONAL THERAPY ADULT IP CONSULT  CARE MANAGEMENT / SOCIAL WORK IP CONSULT  SMOKING CESSATION PROGRAM IP CONSULT    Time Spent on this Encounter   I, Malou Cook MD, personally saw the patient today and spent  greater than 30 minutes discharging this patient.    Discharge Orders      Physical Therapy Referral      Home Care Referral      Reason for your hospital stay    Stroke with expressive aphasia     Follow-up and recommended labs and tests     Follow up with primary care provider, Ena Angel, within 7 days for hospital follow- up.  The following labs/tests are recommended: cbc in 1 week.    Keep stroke follow-up appointment in 4 weeks     Activity    Your activity upon discharge: activity as tolerated     Adult Cardiac Event Monitor     Diet    Follow this diet upon discharge: Orders Placed This Encounter      Combination Diet Easy to Chew (level 7); Thin Liquids (level 0)     Discharge Medications   Current Discharge Medication List        START taking these medications    Details   !! aspirin (ASA) 325 MG EC tablet Take 1 tablet (325 mg) by mouth daily  Qty: 90 tablet, Refills: 3    Associated Diagnoses: Acute ischemic stroke (H)      !! aspirin 81 MG EC tablet Take 1 tablet (81 mg) by mouth daily  Qty: 21 tablet, Refills: 0    Associated Diagnoses: Acute ischemic stroke (H)      atorvastatin (LIPITOR) 80 MG tablet Take 1 tablet (80 mg) by mouth every evening  Qty: 90 tablet, Refills: 3    Associated Diagnoses: Acute ischemic stroke (H)      clopidogrel (PLAVIX) 75 MG tablet 1 tablet (75 mg) by Oral or NG Tube route daily for 21 days  Qty: 21 tablet, Refills: 0    Associated Diagnoses: Acute ischemic stroke (H)       !! - Potential duplicate medications found. Please discuss with provider.        CONTINUE these medications which have NOT CHANGED    Details   insulin glargine (LANTUS PEN) 100 UNIT/ML pen Inject 20 Units Subcutaneous every morning      insulin lispro (HUMALOG VIAL) 100 UNIT/ML vial Inject 15 Units Subcutaneous 3 times daily (before meals)      metFORMIN (GLUCOPHAGE) 500 MG tablet Take 500 mg by mouth 2 times daily (with meals)      NIFEdipine ER (ADALAT CC) 30 MG 24 hr tablet Take 30 mg  by mouth daily      omeprazole (PRILOSEC) 40 MG DR capsule Take 40 mg by mouth daily           STOP taking these medications       methylPREDNISolone (MEDROL DOSEPAK) 4 MG tablet therapy pack Comments:   Reason for Stopping:             Allergies   No Known Allergies  Data   Recent Labs   Lab Test 12/07/23  1105 12/06/23  1339 12/06/23  1231   WBC 7.0  --  8.0   HGB 13.9  --  14.0   MCV 89  --  89     --  273   INR  --  1.09  --       Recent Labs   Lab Test 12/08/23  1236 12/08/23  1050 12/08/23  0751 12/07/23  1209 12/07/23  1105 12/06/23  2344 12/06/23  1339   NA  --   --   --   --  135  --  135   POTASSIUM  --   --   --   --  3.7  --  4.3   CHLORIDE  --   --   --   --  99  --  97*   CO2  --   --   --   --  23  --  24   BUN  --   --   --   --  16.6  --  15.0   CR  --   --   --   --  0.71  --  0.66   ANIONGAP  --   --   --   --  13  --  14   FLACA  --   --   --   --  9.1  --  9.3   * 305* 258*   < > 483*   < > 192*    < > = values in this interval not displayed.         Results for orders placed or performed during the hospital encounter of 12/06/23   CT Head w/o Contrast    Narrative    CT SCAN OF THE HEAD WITHOUT CONTRAST   12/6/2023 1:09 PM     HISTORY: Headache; Neurologic deficit, non-traumatic; speech  difficulty    TECHNIQUE:  Axial images of the head and coronal reformations without  IV contrast material. Radiation dose for this scan was reduced using  automated exposure control, adjustment of the mA and/or kV according  to patient size, or iterative reconstruction technique.    COMPARISON: None.    FINDINGS: There is no evidence of intracranial hemorrhage, mass, acute  infarct or anomaly. The ventricles are normal in size, shape and  configuration. Mild diffuse parenchymal volume loss, within normal  limits for age. Extensive patchy periventricular white matter  hypodensities which are nonspecific, but likely related to chronic  microvascular ischemic disease. Small bilateral thalamic and  corona  radiata lacunar infarcts.    The visualized portions of the sinuses and mastoids appear normal. The  bony calvarium and bones of the skull base appear intact.       Impression    IMPRESSION:   No evidence of acute territorial infarct. Extensive  sequela of chronic microvascular ischemia somewhat limits evaluation  for small infarcts. Consider MRI if there is further clinical concern.    Findings were discussed with Dr. Monroy telephone at 1:46 PM on  12/6/2023.     CECIL FRYE MD         SYSTEM ID:  [id number]   CTA Head Neck with Contrast    Narrative    CT ANGIOGRAM OF THE HEAD AND NECK WITH CONTRAST  12/6/2023 1:12 PM     HISTORY: Code Stroke to evaluate for potential thrombolysis and  thrombectomy. Speech difficulty.    TECHNIQUE:  CT angiography with an injection of 67mL Isovue-370 IV  with scans through the head and neck. Images were transferred to a  separate 3-D workstation where multiplanar reformations and 3-D images  were created. Estimates of carotid stenoses are made relative to the  distal internal carotid artery diameters except as noted. Radiation  dose for this scan was reduced using automated exposure control,  adjustment of the mA and/or kV according to patient size, or iterative  reconstruction technique.    COMPARISON: None.     CT HEAD FINDINGS: No contrast enhancing lesions. Cerebral blood flow  is grossly normal.     CT ANGIOGRAM HEAD FINDINGS:  Scattered bilateral carotid siphon  atherosclerotic calcifications without luminal narrowing. The major  intracranial arteries including the proximal branches of the anterior  cerebral, middle cerebral, and posterior cerebral arteries appear  patent without vascular cutoff. No aneurysm identified. No significant  stenosis. Normal variant fetal origin of the left posterior cerebral  artery. Venous circulation is unremarkable.     CT ANGIOGRAM NECK FINDINGS:   Normal origin of the great vessels from the aortic arch.     Right carotid artery:  The right common and internal carotid arteries  are patent. No significant stenosis or atherosclerotic disease in the  carotid artery.     Left carotid artery: The left common and internal carotid arteries are  patent. No significant stenosis or atherosclerotic disease in the  carotid artery.     Vertebral arteries: Vertebral arteries are patent without evidence of  dissection. No significant stenosis.     Other findings: Mild multilevel cervical spine degenerative changes  without high-grade neural foraminal narrowing or canal stenosis..       Impression    IMPRESSION:   1. Patent arteries in the head and neck without vascular cutoff. No  evidence of dissection. No aneurysm identified. No significant  stenosis.    Findings were discussed with Dr. Monroy telephone at 1:46 PM on  12/6/2023.     CECIL FRYE MD         SYSTEM ID:  [id number]   CT Head Perfusion w Contrast - For Tier 2 Stroke    Narrative    CT BRAIN PERFUSION 12/6/2023 1:37 PM    HISTORY: Headache; Neurologic deficit, non-traumatic; None of the  following: Altered mental status, language deficit, sensory loss,  visual symptoms, or weakness    TECHNIQUE: Time sequential axial CT images of the head were acquired  during the administration of 50 mL Isovue-370 IV. Color perfusion maps  of the brain were created from this time sequential axial source data.      Radiation dose for this scan was reduced using automated exposure  control, adjustment of the mA and/or kV according to patient size, or  iterative reconstruction technique.    COMPARISON: None.    FINDINGS: Minimal increased time to drain, increased mean transit  time, and decreased cerebral blood flow in the right anterior frontal  and left lateral frontal lobes is noted but below the threshold for  RAPID analysis.    CBF<30% volume (mL): 0  Tmax>6.0s volume (mL): 0  Mismatch volume (mL): 0  Mismatch ratio: None      Impression    IMPRESSION:   1. No findings to suggest an acute infarct.  2.  Very minimal qualitative asymmetry on time to drain, mean transit  time, and cerebral blood flow maps involving the right anterior  frontal lobe and left lateral frontal lobe. This did not meet criteria  for RAPID analysis. This may reflect low level hypoperfusion in these  areas.    Findings were discussed with Dr. Monroy telephone at 1:46 PM on  12/6/2023.     CECIL FRYE MD         SYSTEM ID:  [id number]   MR Brain w/o & w Contrast    Narrative    MRI BRAIN WITHOUT AND WITH CONTRAST  12/6/2023 4:26 PM     HISTORY:  speech deficits     TECHNIQUE:  Multiplanar, multisequence MRI of the brain without and  with 7mL Gadavist     COMPARISON: Same day CT     FINDINGS: Small focus of diffusion restriction in the right corpus  callosum and adjacent supraventricular white matter (series 6, image  67). This is immediately adjacent to a chronic infarct. Minimal  associated FLAIR signal abnormality. Extensive background chronic  microvascular ischemia with multiple prior basal ganglia, thalamic,  and corona radiata infarcts.    There is no evidence of acute hemorrhage, mass, or herniation. Mild  generalized parenchymal volume loss with associated ex vacuo  dilatation of the ventricles.     There is no abnormal intracranial enhancement.     Minimal scattered paranasal sinus mucous thickening. No mastoid or  middle ear effusion. The major arterial T2 flow voids at the base of  the brain appear patent.       Impression    IMPRESSION:    1. Small acute infarct in the right corpus callosum and adjacent  supraventricular white matter.  2. There is generalized atrophy of the brain. Extensive white matter  changes are present in the cerebral hemispheres that are consistent  with small vessel ischemic disease in this age patient.    Findings were discussed with Dr. Monroy via telephone at 4:36 PM on  12/6/2023.     CECIL FRYE MD         SYSTEM ID:  [id number]   XR Chest Port 1 View    Narrative    CHEST ONE VIEW  12/7/2023  11:42 AM     HISTORY: Chest pain evaluation.    COMPARISON: None available.      Impression    IMPRESSION: Slightly low lung volumes. No focal consolidation, pleural  effusion or pneumothorax. Aorta appears slightly tortuous.  Cardiomediastinal silhouette is otherwise unremarkable given AP  technique.     IMMANUEL LI MD         SYSTEM ID:  [id number]   CT Chest Pulmonary Embolism w Contrast    Narrative    CT CHEST PULMONARY EMBOLISM WITH CONTRAST 12/7/2023 2:24 PM    CLINICAL HISTORY: Stroke, headache, slurred speech. Evaluation for PE.    TECHNIQUE: CT angiogram chest during arterial phase injection IV  contrast. 2D and 3D MIP reconstructions were performed by the CT  technologist. Dose reduction techniques were used.     CONTRAST: 63 mL Isovue-370    COMPARISON: None.    FINDINGS:  ANGIOGRAM CHEST: Pulmonary arteries are normal caliber and negative  for pulmonary emboli. Thoracic aorta is negative for dissection. No CT  evidence of right heart strain.    LUNGS AND PLEURA: No infiltrates. No effusions.    MEDIASTINUM/AXILLAE: No adenopathy or aneurysm.    CORONARY ARTERY CALCIFICATIONS: None.    UPPER ABDOMEN: No acute findings.    MUSCULOSKELETAL: L1 compression deformity with near complete loss of  height. This is age indeterminate.      Impression    IMPRESSION:  1.  No pulmonary embolism demonstrated.  2.  Age indeterminate L1 compression deformity with near complete loss  of height.    JAYCEE TAYLOR MD         SYSTEM ID:  [id number]   US Lower Extremity Venous Duplex Bilateral    Narrative    EXAM: US LOWER EXTREMITY VENOUS DUPLEX BILATERAL  LOCATION: [facility]  DATE: 12/7/2023    INDICATION: Bilateral lower extremity pain, swelling and edema.  COMPARISON: None.  TECHNIQUE: Venous Duplex ultrasound of bilateral lower extremities with and without compression, augmentation and duplex. Color flow and spectral Doppler with waveform analysis performed.    FINDINGS: Exam includes  the common femoral, femoral, popliteal veins as well as segmentally visualized deep calf veins and greater saphenous vein.     RIGHT: No deep vein thrombosis. No superficial thrombophlebitis. No popliteal cyst.    LEFT: No deep vein thrombosis. No superficial thrombophlebitis. No popliteal cyst.      Impression    IMPRESSION:  1.  No deep venous thrombosis in the bilateral lower extremities.   Echo Limited     Value    LVEF  60-65%    [facility]    [id number]  [id number]  [id number]  [id number]^SYLVIA^KIKO^MARCUS     [facility]  Echocardiography Laboratory  [address] [place] [id number]     Name: ANA CHAPA  MRN: [id number]  : 1950  Study Date: 2023 10:27 AM  Age: 73 yrs  Gender: Female  Patient Location: [facility]  Reason For Study: Cerebrovascular Incident  Ordering Physician: KIKO WARD  Performed By: Shima Jeong     Weight: 160 lb  HR: 104  BP: 176/114 mmHg  ______________________________________________________________________________  Procedure  Limited Portable Echo Adult.     ______________________________________________________________________________  Interpretation Summary     1. Normal biventricular size and function. Left ventricular ejection fraction  of 60-65%.  2. No segmental wall motion abnormalities noted.  3. No hemodynamically significant valvular disease.  No prior study for comparison.  ______________________________________________________________________________  Left Ventricle  The left ventricle is normal in size. There is normal left ventricular wall  thickness. Left ventricular systolic function is normal. The visual ejection  fraction is 60-65%. No regional wall motion abnormalities noted.     Right Ventricle  The right ventricle is normal in size and function.     Atria  Normal left atrial size. Right atrial size is normal.     Mitral Valve  The mitral valve leaflets appear normal. There is no evidence of stenosis,  fluttering, or prolapse.  There is trace mitral regurgitation.     Aortic Valve  There is mild trileaflet aortic sclerosis. There is trace aortic  regurgitation.     Pulmonic Valve  The pulmonic valve is not well visualized.     Vessels  Normal size aorta. IVC diameter <2.1 cm collapsing >50% with sniff suggests a  normal RA pressure of 3 mmHg.     Pericardium  There is no pericardial effusion.     Rhythm  Sinus rhythm was noted.     ______________________________________________________________________________  MMode/2D Measurements & Calculations  asc Aorta Diam: 3.1 cm     ______________________________________________________________________________  Report approved by: Olga Persaud 12/07/2023 11:30 AM

## 2023-12-08 NOTE — PLAN OF CARE
Goal Outcome Evaluation:       Patient alert, oriented times 4. Neuro's with L facial droop, left sided weakness. Up with SBA, ambulates with walker or cane. VSS. Tele ST. On a level 7 diet with thin liquids, good appetite, family brought food from home. Continent of bowel and bladder.  used for assessments. Daughter in law at bedside this morning. Patient will discharge this afternoon, discharge instructions discussed with patient and her son. Cardiac monitor placed.

## 2023-12-09 LAB
ATRIAL RATE - MUSE: 109 BPM
DIASTOLIC BLOOD PRESSURE - MUSE: NORMAL MMHG
INTERPRETATION ECG - MUSE: NORMAL
P AXIS - MUSE: 58 DEGREES
PR INTERVAL - MUSE: 158 MS
QRS DURATION - MUSE: 74 MS
QT - MUSE: 344 MS
QTC - MUSE: 463 MS
R AXIS - MUSE: 22 DEGREES
SYSTOLIC BLOOD PRESSURE - MUSE: NORMAL MMHG
T AXIS - MUSE: 34 DEGREES
VENTRICULAR RATE- MUSE: 109 BPM

## 2023-12-09 NOTE — PROGRESS NOTES
"      [facility]    Stroke Progress Note    Interval Events  Reviewed ongoing secondary stroke prevention plan which includes taking aspirin 81 mg and Plavix 75 mg daily for 21 days, then discontinuing Plavix and continuing taking aspirin 325 mg indefinitely.  Encouraged beginning daily at home blood pressure monitoring, recording a BP log, and bring the results to her PCP in 1 to 2 weeks after discharge. Discussed need for daily statin to help lower LDL. Discussed need for heart monitor to evaluate for any underlying abnormal rhythm, including atrial fibrillation.    Discussed recent loss of her daughter and offered my condolences. I offered a referral for outpatient mental health management and/or grief counseling and at this time the patient declined.  She reported that she has adequate support through her son and daughter-in-law at home.    Patient reported persistent speech changes as she notes her speech is different from her baseline as it is more slurred and her speech is much softer. I encouraged her that this would improve with continued outpatient speech therapy.    Used [employer]  during examination and patient discussion.     HPI Summary  Angelika Quiroz is a 73 year old female with history of HTN, DM2, prior stroke (records not available) who was brought to the ED 12/6 for evaluation of abnormal language. Over the weekend, her daughter passed away. Since then, she has not been eating or sleeping well.  On 12/5, it was first noticed that her speech pattern seemed abnormal. On waking on 12/6, her speech was described as \"stuttering\" and she was talking quieter than usual.  She was also noted to have an abnormal gait, but was not clearly weak in any extremity. Not on ASA PTA.     Yesterday, she had an RRT for chest pain and tachycardia. CT chest PE was negative.     Stroke Evaluation Summarized  MRI/Head CT MRI with acute infarct in the R corpus callosum   Intracranial " Vasculature CTA head unremarkable   Cervical Vasculature CTA neck unremarkable      Echocardiogram EF 60-65%, Normal left atrial size. Right atrial size is normal.  No segmental wall motion abnormalities noted. Sinus rhythm was noted.    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 Discussed the importance of home BP monitoring and keeping a log for PCP.   - Therapies, as needed  - Bedside Glucose Monitoring  - Euthermia, Euglycemia     Diagnostic testing  - Continue telemetry while inpatient  - Check 30 day CardioNet monitor on discharge to screen for atrial fibrillation (ordered)    Secondary stroke prevention  - Continue Plavix 75 mg and aspirin 81mg daily together for 21 days; then aspirin 325 mg alone indefinitely  - Should not use Prilosec while taking Plavix as it can reduce effectiveness. Prevacid or Pepcid okay.   -  (goal 40-70); initiate Lipitor 80 mg with ongoing outpatient titration to achieve LDL goal   - Blood glucose monitoring, Hgb A1c 7.7%, (goal <7% for secondary stroke prevention), follow-up with PCP regarding diabetes management  - Education: Stroke education   - Encourage Mediterranean diet and daily exercise  - Offered outpatient mental health evaluation or grief consulting but patient declined at this time. Patient is able to connect with me via [employer] or through her PCP if needed in the future.      Patient Follow-up    - in the next 1-2 week(s) with PCP  - in 6-8 weeks with Merlene Carlos CNP or Sheron Mercado PA-C ([phone]) (ordered)    No further stroke  "evaluation is recommended, so we will sign off. Please contact us with any additional questions.    Sheron Mercado PA-C  Vascular Neurology    To page me or covering stroke neurology team member, click here: [facility]  Choose \"On Call\" tab at top, then select \"NEUROLOGY/ALL SITES\" from middle drop-down box, press Enter, then look for \"stroke\" or \"telestroke\" for your site.  ______________________________________________________    Clinically Significant Risk Factors                        # DMII: A1C = 7.7 % (Ref range: <5.7 %) within past 6 months, PRESENT ON ADMISSION               Medications   Scheduled Meds   aspirin  81 mg Oral Daily    Or    aspirin  81 mg Oral or NG Tube Daily    atorvastatin  80 mg Oral QPM    clopidogrel  75 mg Oral or NG Tube Daily    insulin aspart  15 Units Subcutaneous TID AC    insulin aspart  1-7 Units Subcutaneous TID AC    insulin aspart  1-5 Units Subcutaneous At Bedtime    insulin glargine  20 Units Subcutaneous QAM    lidocaine  1 patch Transdermal Q24H    [Held by provider] NIFEdipine ER  30 mg Oral Daily    pantoprazole  40 mg Oral BID AC    sodium chloride (PF)  3 mL Intracatheter Q8H       Infusion Meds   - MEDICATION INSTRUCTIONS -      - MEDICATION INSTRUCTIONS -         PRN Meds  acetaminophen, maalox/mylanta w/ simethicone, glucose **OR** dextrose **OR** glucagon, lidocaine 4%, lidocaine (buffered or not buffered), - MEDICATION INSTRUCTIONS -, - MEDICATION INSTRUCTIONS -, melatonin, nitroGLYcerin, sodium chloride (PF)       PHYSICAL EXAMINATION  Temp:  [97.7  F (36.5  C)-98.4  F (36.9  C)] 98.4  F (36.9  C)  Pulse:  [] 81  Resp:  [14-22] 14  BP: ()/() 147/109  SpO2:  [92 %-98 %] 95 %      General Exam  General:  patient sitting in the chair without any acute distress    HEENT:  normocephalic/atraumatic  Pulmonary:  no respiratory distress    Neuro Exam  Mental Status:  alert, oriented self, situation, age and month, follows commands, mild dysarthria, soft " "speech, naming normal (able to name pen, and what to do with it \"write\")  Cranial Nerves:  visual fields intact, EOMI with normal smooth pursuit, facial sensation intact and symmetric, facial movements symmetric, hearing not formally tested but intact to conversation, tongue protrusion midline  Motor:  normal muscle tone and bulk, no abnormal movements, able to move all limbs spontaneously, no pronator drift  Sensory:  light touch sensation intact and symmetric throughout upper and lower extremities, no extinction on double simultaneous stimulation   Coordination:  normal finger-to-nose and heel-to-shin bilaterally without dysmetria  Station/Gait:  deferred    Imaging  I personally reviewed all imaging; relevant findings per HPI.     Lab Results Data   CBC  Recent Labs   Lab 12/07/23  1105 12/06/23  1231   WBC 7.0 8.0   RBC 4.65 4.72   HGB 13.9 14.0   HCT 41.2 42.1    273     Basic Metabolic Panel    Recent Labs   Lab 12/08/23  0751 12/08/23  0206 12/07/23  2200 12/07/23  1209 12/07/23  1105 12/06/23  2344 12/06/23  1339   NA  --   --   --   --  135  --  135   POTASSIUM  --   --   --   --  3.7  --  4.3   CHLORIDE  --   --   --   --  99  --  97*   CO2  --   --   --   --  23  --  24   BUN  --   --   --   --  16.6  --  15.0   CR  --   --   --   --  0.71  --  0.66   * 218* 305*   < > 483*   < > 192*   FLACA  --   --   --   --  9.1  --  9.3    < > = values in this interval not displayed.     Liver Panel  No results for input(s): \"PROTTOTAL\", \"ALBUMIN\", \"BILITOTAL\", \"ALKPHOS\", \"AST\", \"ALT\", \"BILIDIRECT\" in the last 168 hours.  INR    Recent Labs   Lab Test 12/06/23  1339   INR 1.09      Lipid Profile    Recent Labs   Lab Test 12/06/23  1339   CHOL 210*   HDL 69   *   TRIG 69     A1C    Recent Labs   Lab Test 12/06/23  1231   A1C 7.7*     Troponin    Recent Labs   Lab 12/07/23  1105 12/06/23  1339   CTROPT 8 8          Data   I have personally spent a total of 60 minutes providing care today, time spent " in reviewing medical records and reviewing tests, examining the patient and obtaining history, coordination of care, and discussion with the patient and/or family regarding diagnostic results, prognosis, symptom management, risks and benefits of management options, and development of plan of care. Greater than 50% was spent in counseling and coordination of care.

## 2023-12-09 NOTE — PLAN OF CARE
"Physical Therapy Discharge Summary    Reason for therapy discharge:    Discharged to home with home therapy.    Progress towards therapy goal(s). See goals on Care Plan in [facility] electronic health record for goal details.  Goals partially met.  Barriers to achieving goals:   discharge from facility.    Therapy recommendation(s):    Continued therapy is recommended.  Rationale/Recommendations:  Pt with mild functional weakness, grossly needing supervision with amb 300+ft and climbing/desc stairs. She does have residual dynamic balance impairments from stroke 10yrs ago in [facility], recommend OPPT to address and to improve quality of life. Anticipate pt can d/c home with Ax1 and 2WW. Pt does have support from family, per report \"she is never alone.\".        *Discharging physical therapist did not work directly with patient. Therapy recommendation(s) taken from previous treating therapist's treatment note from last treatment session.   "

## 2023-12-11 ENCOUNTER — MEDICAL CORRESPONDENCE (OUTPATIENT)
Dept: HEALTH INFORMATION MANAGEMENT | Facility: CLINIC | Age: 73
End: 2023-12-11
Payer: COMMERCIAL

## 2023-12-27 ENCOUNTER — MEDICAL CORRESPONDENCE (OUTPATIENT)
Dept: HEALTH INFORMATION MANAGEMENT | Facility: CLINIC | Age: 73
End: 2023-12-27
Payer: COMMERCIAL